# Patient Record
Sex: FEMALE | Race: BLACK OR AFRICAN AMERICAN | Employment: OTHER | URBAN - METROPOLITAN AREA
[De-identification: names, ages, dates, MRNs, and addresses within clinical notes are randomized per-mention and may not be internally consistent; named-entity substitution may affect disease eponyms.]

---

## 2017-01-24 RX ORDER — FENOFIBRATE 48 MG/1
TABLET, COATED ORAL
Qty: 30 TAB | Refills: 0 | Status: SHIPPED | OUTPATIENT
Start: 2017-01-24 | End: 2017-04-02 | Stop reason: SDUPTHER

## 2017-02-06 ENCOUNTER — OFFICE VISIT (OUTPATIENT)
Dept: INTERNAL MEDICINE CLINIC | Age: 67
End: 2017-02-06

## 2017-02-06 VITALS
RESPIRATION RATE: 76 BRPM | BODY MASS INDEX: 37.92 KG/M2 | TEMPERATURE: 97.9 F | SYSTOLIC BLOOD PRESSURE: 130 MMHG | HEIGHT: 63 IN | DIASTOLIC BLOOD PRESSURE: 81 MMHG | WEIGHT: 214 LBS | OXYGEN SATURATION: 95 %

## 2017-02-06 DIAGNOSIS — E11.9 CONTROLLED TYPE 2 DIABETES MELLITUS WITHOUT COMPLICATION, WITHOUT LONG-TERM CURRENT USE OF INSULIN (HCC): ICD-10-CM

## 2017-02-06 DIAGNOSIS — I10 ESSENTIAL HYPERTENSION, BENIGN: Primary | ICD-10-CM

## 2017-02-06 LAB — HBA1C MFR BLD HPLC: 6.4 % (ref 4.8–5.6)

## 2017-02-06 NOTE — PROGRESS NOTES
1. Have you been to the ER, urgent care clinic since your last visit? Hospitalized since your last visit?no    2. Have you seen or consulted any other health care providers outside of the [address] [place] [address] since your last visit? Include any pap smears or colon screening. no

## 2017-02-06 NOTE — MR AVS SNAPSHOT
Visit Information Date & Time Provider Department Dept. Phone Encounter #  
 2/6/2017  3:45 PM Mike Tejeda, [address] [id number] Follow-up Instructions Return in about 3 months (around 5/6/2017) for follow up diabetes. Upcoming Health Maintenance Date Due Hepatitis C Screening 1950 DTaP/Tdap/Td series (1 - Tdap) 3/4/1969 EYE EXAM RETINAL OR DILATED Q1 11/20/2015 Pneumococcal 65+ Low/Medium Risk (2 of 2 - PCV13) 1/15/2016 FOOT EXAM Q1 1/15/2016 BREAST CANCER SCRN MAMMOGRAM 4/13/2016 MEDICARE YEARLY EXAM 7/23/2016 INFLUENZA AGE 9 TO ADULT 8/1/2016 GLAUCOMA SCREENING Q2Y 11/20/2016 HEMOGLOBIN A1C Q6M 12/28/2016 MICROALBUMIN Q1 3/8/2017 LIPID PANEL Q1 3/8/2017 COLONOSCOPY 8/23/2026 Allergies as of 2/6/2017  Review Complete On: 2/6/2017 By: Mike Tejeda MD  
  
 Severity Noted Reaction Type Reactions Ampicillin (Bulk)  11/06/2009    Rash Astelin [Azelastine]  08/10/2010   Intolerance Other (comments) NASAL IRRITATION Lipitor [Atorvastatin]  10/30/2009    Other (comments) Myalgias Pravachol [Pravastatin]  11/06/2009    Myalgia Welchol [Colesevelam]  04/23/2012   Side Effect Myalgia Zetia [Ezetimibe]  10/30/2009    Other (comments) Myalgias Zocor [Simvastatin]  10/30/2009    Nausea Only Current Immunizations  Reviewed on 11/10/2011 Name Date Pneumococcal Polysaccharide (PPSV-23) 1/15/2015 Not reviewed this visit You Were Diagnosed With   
  
 Codes Comments Essential hypertension, benign    -  Primary ICD-10-CM: I10 
ICD-9-CM: 401.1 Controlled type 2 diabetes mellitus without complication, without long-term current use of insulin ([facility]     ICD-10-CM: E11.9 ICD-9-CM: 250.00 Vitals  BP Temp Resp Height(growth percentile) Weight(growth percentile) SpO2  
 130/81 (BP 1 Location: Left arm, BP Patient Position: Sitting) 97.9 °F (36.6 °C) (Oral) (!) 76 5' 3\" (1.6 m) 214 lb (97.1 kg) 95% BMI OB Status Smoking Status 37.91 kg/m2 Hysterectomy Never Smoker BMI and BSA Data Body Mass Index Body Surface Area  
 37.91 kg/m 2 2.08 m 2 Preferred Pharmacy Pharmacy Name Phone [facility] [address] [address] [phone] Your Updated Medication List  
  
   
This list is accurate as of: 2/6/17  4:35 PM.  Always use your most recent med list.  
  
  
  
  
 ascorbic acid (vitamin C) 500 mg tablet Commonly known as:  VITAMIN C Take  by mouth. aspirin delayed-release 325 mg tablet Take 325 mg by mouth daily. fenofibrate nanocrystallized 48 mg tablet Commonly known as:  TRICOR  
TAKE 1 TABLET BY MOUTH EVERY DAY  
  
 FISH OIL PO Take 1,000 mg by mouth two (2) times a day. fluticasone 50 mcg/actuation nasal spray Commonly known as:  Tom Peel 2 Sprays by Both Nostrils route daily. * glucose blood VI test strips strip Commonly known as:  blood glucose test  
Check Blood Sugar 6-7 times a week * glucose blood VI test strips strip Commonly known as:  ASCENSIA AUTODISC VI, ONE TOUCH ULTRA TEST VI Check blood glucose twice daily. LOFIBRA 54 mg tablet Generic drug:  fenofibrate Take  by mouth daily. losartan-hydroCHLOROthiazide 100-25 mg per tablet Commonly known as:  HYZAAR  
TAKE 1 TABLET BY MOUTH EVERY DAY  
  
 MULTIVITAMIN PO Take  by mouth daily. Centrum silver  
  
 raNITIdine 300 mg tablet Commonly known as:  ZANTAC TAKE 1 TABLET BY MOUTH EVERY DAY  
  
 VITAMIN B-6 PO Take  by mouth daily. VITAMIN D3 2,000 unit Tab Generic drug:  cholecalciferol (vitamin D3) Take 2,000 Units by mouth daily. * Notice: This list has 2 medication(s) that are the same as other medications prescribed for you.  Read the directions carefully, and ask your doctor or other care provider to review them with you. We Performed the Following AMB POC HEMOGLOBIN A1C [82186 CPT(R)] Follow-up Instructions Return in about 3 months (around 5/6/2017) for follow up diabetes. Introducing [facility] SERVICES! Juan Alberto Chavez introduces D[employer]OM patient portal. Now you can access parts of your medical record, email your doctor's office, and request medication refills online. 1. In your internet browser, go to [email or web address] 2. Click on the First Time User? Click Here link in the Sign In box. You will see the New Member Sign Up page. 3. Enter your D[employer]OM Access Code exactly as it appears below. You will not need to use this code after youve completed the sign-up process. If you do not sign up before the expiration date, you must request a new code. · D[employer]OM Access Code: [email or web address] Expires: 5/7/2017  4:35 PM 
 
4. Enter the last four digits of your Social Security Number (xxxx) and Date of Birth (mm/dd/yyyy) as indicated and click Submit. You will be taken to the next sign-up page. 5. Create a DZZOM ID. This will be your DZZOM login ID and cannot be changed, so think of one that is secure and easy to remember. 6. Create a DZZOM password. You can change your password at any time. 7. Enter your Password Reset Question and Answer. This can be used at a later time if you forget your password. 8. Enter your e-mail address. You will receive e-mail notification when new information is available in [address] 9. Click Sign Up. You can now view and download portions of your medical record. 10. Click the Download Summary menu link to download a portable copy of your medical information. If you have questions, please visit the Frequently Asked Questions section of the D[employer]OM website. Remember, DZZOM is NOT to be used for urgent needs. For medical emergencies, dial 911. Now available from your iPhone and Android! Please provide this summary of care documentation to your next provider. Your primary care clinician is listed as Sandoval Pritchett. If you have any questions after today's visit, please call [phone].

## 2017-02-06 NOTE — PROGRESS NOTES
SUBJECTIVE:   Ms. Alena Oneill is a 74 y.o. female who is here for follow up of DM. Pt reports having emesis and diarrhea over the weekend. Pt states she had multiple episodes of emesis. Pt claims she began being able to hold down liquids on Saturday. Pt endorses contact with persons with similar symptoms. Pt claims her stools have returned to normal.     Pt's BP is well controlled at 130/81 today. Pt denies exercise outside regular daily activity. Pt claims she stays on the go. Pt reports episodes of burning/stinging in left hand. Pt claims she is right handed, and had an injection in her right hand a few months ago. Pt denies BLE edema. Mammogram: due 04/2017. Pt states they call her to schedule. Ophthalmology: up to date, 11/2016  Flu: declined today  Microfilament test: due    At this time, she is otherwise doing well and has brought no other complaints to my attention today. For a list of the medical issues addressed today, see the assessment and plan below. 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 All: is allergic to ampicillin (bulk); astelin [azelastine]; lipitor [atorvastatin]; pravachol [pravastatin]; welchol [colesevelam]; zetia [ezetimibe]; and zocor [simvastatin].    MEDS:   Current Outpatient Prescriptions   Medication Sig    fenofibrate nanocrystallized (TRICOR) 48 mg tablet TAKE 1 TABLET BY MOUTH EVERY DAY    losartan-hydroCHLOROthiazide (HYZAAR) 100-25 mg per tablet TAKE 1 TABLET BY MOUTH EVERY DAY    fenofibrate (LOFIBRA) 54 mg tablet Take  by mouth daily.  glucose blood VI test strips (ASCENSIA AUTODISC VI, ONE TOUCH ULTRA TEST VI) strip Check blood glucose twice daily.  ranitidine (ZANTAC) 300 mg tablet TAKE 1 TABLET BY MOUTH EVERY DAY    fluticasone (FLONASE) 50 mcg/actuation nasal spray 2 Sprays by Both Nostrils route daily.  ascorbic acid (VITAMIN C) 500 mg tablet Take  by mouth.  glucose blood VI test strips (BLOOD GLUCOSE TEST) strip Check Blood Sugar 6-7 times a week    cholecalciferol, vitamin D3, (VITAMIN D-3) 2,000 unit Tab Take 2,000 Units by mouth daily.  PYRIDOXINE HCL (VITAMIN B-6 PO) Take  by mouth daily.  aspirin delayed-release 325 mg tablet Take 325 mg by mouth daily.  MULTIVITAMINS (MULTIVITAMIN PO) Take  by mouth daily. Centrum silver    DOCOSAHEXANOIC ACID/EPA (FISH OIL PO) Take 1,000 mg by mouth two (2) times a day. No current facility-administered medications for this visit. FH: family history includes Alcohol abuse in her brother; Diabetes in her brother, mother, and sister; Heart Attack in her father; Heart Disease in her father, mother, and sister; Hypertension in her father and mother; Kidney Disease in her sister and sister; Stroke in her mother; Tuberculosis in her brother. SH:  reports that she has never smoked. She has never used smokeless tobacco. She reports that she drinks alcohol. She reports that she does not use illicit drugs.      Review of Systems - History obtained from the patient  General ROS: negative  Psychological ROS: negative  Ophthalmic ROS: negative  ENT ROS: negative  Respiratory ROS: no cough, shortness of breath, or wheezing  Cardiovascular ROS: no chest pain or dyspnea on exertion  Gastrointestinal ROS: no abdominal pain, change in bowel habits, or black or bloody stools  Genito-Urinary ROS: negative  Musculoskeletal ROS: negative  Neurological ROS: negative  Dermatological ROS: negative    OBJECTIVE:   Vitals:   Visit Vitals    /81 (BP 1 Location: Left arm, BP Patient Position: Sitting)    Temp 97.9 °F (36.6 °C) (Oral)    Resp (!) 76    Ht 5' 3\" (1.6 m)    Wt 214 lb (97.1 kg)    SpO2 95%    BMI 37.91 kg/m2      Gen: Pleasant 74 y.o.  female in NAD. HEENT: NC/AT. HEART: RRR, No M/G/R. LUNGS: CTAB No W/R. EXTREMITIES: Warm. No C/C/E. NEURO: Alert and oriented x 3. Cranial nerves grossly intact. No focal sensory or motor deficits noted. SKIN: Warm. Dry. No rashes or other lesions noted. ASSESSMENT/ PLAN:     Brice Meigs was seen today for diabetes, follow-up, other, other and other. Diagnoses and all orders for this visit:    Essential hypertension, benign    Controlled type 2 diabetes mellitus without complication, without long-term current use of insulin (HCC)  -     AMB POC HEMOGLOBIN A1C    This patient's blood pressure is stable and controlled on the current medication. Continue the current regimen. I ordered a POC Hgb A1C for monitoring of DM. I advised patient to exercise 3x/week. Pt was instructed to f/u if nausea persists, and eat/drink soft and bland foods. Pt will f/u in 3 months for DM. Follow-up Disposition:  Return in about 3 months (around 5/6/2017) for follow up diabetes. I have reviewed the patient's medications and risks/side effects/benefits were discussed. Diagnosis(-es) explained to patient and questions answered. Literature provided where appropriate.      Written by Humberto Dc, as dictated by Miguel Sultana MD.

## 2017-02-09 ENCOUNTER — TELEPHONE (OUTPATIENT)
Dept: INTERNAL MEDICINE CLINIC | Age: 67
End: 2017-02-09

## 2017-04-03 RX ORDER — FENOFIBRATE 48 MG/1
TABLET, COATED ORAL
Qty: 30 TAB | Refills: 0 | Status: SHIPPED | OUTPATIENT
Start: 2017-04-03 | End: 2017-05-02 | Stop reason: SDUPTHER

## 2017-05-02 RX ORDER — FENOFIBRATE 48 MG/1
TABLET, COATED ORAL
Qty: 30 TAB | Refills: 0 | Status: SHIPPED | OUTPATIENT
Start: 2017-05-02 | End: 2017-06-01 | Stop reason: SDUPTHER

## 2017-06-01 RX ORDER — FENOFIBRATE 48 MG/1
TABLET, COATED ORAL
Qty: 30 TAB | Refills: 0 | Status: SHIPPED | OUTPATIENT
Start: 2017-06-01 | End: 2017-07-06 | Stop reason: SDUPTHER

## 2017-06-14 ENCOUNTER — HOSPITAL ENCOUNTER (OUTPATIENT)
Dept: LAB | Age: 67
Discharge: HOME | End: 2017-06-14
Payer: MEDICARE

## 2017-06-14 ENCOUNTER — OFFICE VISIT (OUTPATIENT)
Dept: INTERNAL MEDICINE CLINIC | Age: 67
End: 2017-06-14

## 2017-06-14 VITALS
RESPIRATION RATE: 16 BRPM | WEIGHT: 219.2 LBS | BODY MASS INDEX: 38.84 KG/M2 | SYSTOLIC BLOOD PRESSURE: 125 MMHG | DIASTOLIC BLOOD PRESSURE: 75 MMHG | HEART RATE: 67 BPM | TEMPERATURE: 97.8 F | OXYGEN SATURATION: 96 % | HEIGHT: 63 IN

## 2017-06-14 DIAGNOSIS — E78.5 DYSLIPIDEMIA: ICD-10-CM

## 2017-06-14 DIAGNOSIS — Z00.00 ROUTINE GENERAL MEDICAL EXAMINATION AT A HEALTH CARE FACILITY: Primary | ICD-10-CM

## 2017-06-14 DIAGNOSIS — Z23 NEED FOR VACCINATION WITH 13-POLYVALENT PNEUMOCOCCAL CONJUGATE VACCINE: ICD-10-CM

## 2017-06-14 DIAGNOSIS — E11.9 CONTROLLED TYPE 2 DIABETES MELLITUS WITHOUT COMPLICATION, WITHOUT LONG-TERM CURRENT USE OF INSULIN (HCC): ICD-10-CM

## 2017-06-14 DIAGNOSIS — Z78.0 POSTMENOPAUSAL: ICD-10-CM

## 2017-06-14 DIAGNOSIS — Z13.39 SCREENING FOR ALCOHOLISM: ICD-10-CM

## 2017-06-14 DIAGNOSIS — I10 ESSENTIAL HYPERTENSION, BENIGN: ICD-10-CM

## 2017-06-14 DIAGNOSIS — Z11.59 NEED FOR HEPATITIS C SCREENING TEST: ICD-10-CM

## 2017-06-14 DIAGNOSIS — Z13.31 SCREENING FOR DEPRESSION: ICD-10-CM

## 2017-06-14 PROCEDURE — 83036 HEMOGLOBIN GLYCOSYLATED A1C: CPT

## 2017-06-14 PROCEDURE — 80053 COMPREHEN METABOLIC PANEL: CPT

## 2017-06-14 PROCEDURE — 86803 HEPATITIS C AB TEST: CPT

## 2017-06-14 PROCEDURE — 36415 COLL VENOUS BLD VENIPUNCTURE: CPT

## 2017-06-14 PROCEDURE — 82043 UR ALBUMIN QUANTITATIVE: CPT

## 2017-06-14 PROCEDURE — 80061 LIPID PANEL: CPT

## 2017-06-14 NOTE — PROGRESS NOTES
This is a Subsequent Medicare Annual Wellness Visit providing Personalized Prevention Plan Services (PPPS) (Performed 12 months after initial AWV and PPPS )    I have reviewed the patient's medical history in detail and updated the computerized patient record. 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 Deejay Barnes  04/21/08    negative; f/u 1 yr    HX KNEE ARTHROSCOPY Right 1999    HX ORTHOPAEDIC  01/04    trigger finger    HX SHOULDER ARTHROSCOPY      HX XAVIER AND BSO      [place]    Dr. Thuy Giles CUFF,CHRONIC  02/2004    Dr. Los Narvaez     Current Outpatient Prescriptions   Medication Sig Dispense Refill    fenofibrate nanocrystallized (TRICOR) 48 mg tablet TAKE 1 TABLET BY MOUTH EVERY DAY 30 Tab 0    losartan-hydroCHLOROthiazide (HYZAAR) 100-25 mg per tablet TAKE 1 TABLET BY MOUTH EVERY DAY 30 Tab 6    fenofibrate (LOFIBRA) 54 mg tablet Take  by mouth daily.  glucose blood VI test strips (ASCENSIA AUTODISC VI, ONE TOUCH ULTRA TEST VI) strip Check blood glucose twice daily. 100 Strip 11    ranitidine (ZANTAC) 300 mg tablet TAKE 1 TABLET BY MOUTH EVERY DAY 60 Tab 6    fluticasone (FLONASE) 50 mcg/actuation nasal spray 2 Sprays by Both Nostrils route daily. 1 Bottle 3    ascorbic acid (VITAMIN C) 500 mg tablet Take  by mouth.       glucose blood VI test strips (BLOOD GLUCOSE TEST) strip Check Blood Sugar 6-7 times a week 100 strip 11    cholecalciferol, vitamin D3, (VITAMIN D-3) 2,000 unit Tab Take 2,000 Units by mouth daily.  PYRIDOXINE HCL (VITAMIN B-6 PO) Take  by mouth daily.  aspirin delayed-release 325 mg tablet Take 325 mg by mouth daily.  MULTIVITAMINS (MULTIVITAMIN PO) Take  by mouth daily. Centrum silver      DOCOSAHEXANOIC ACID/EPA (FISH OIL PO) Take 1,000 mg by mouth two (2) times a day.        Allergies   Allergen Reactions    Ampicillin (Bulk) Rash    Astelin [Azelastine] Other (comments)     NASAL IRRITATION    Lipitor [Atorvastatin] Other (comments)     Myalgias      Pravachol [Pravastatin] Myalgia    Welchol [Colesevelam] Myalgia    Zetia [Ezetimibe] Other (comments)     Myalgias      Zocor [Simvastatin] Nausea Only     Family History   Problem Relation Age of Onset   [facility] Hypertension Mother    [facility] Stroke Mother     Diabetes Mother     Heart Disease Mother     Hypertension Father     Heart Disease Father     Heart Attack Father     Heart Disease Sister     Alcohol abuse Brother     Tuberculosis Brother     Diabetes Sister     Kidney Disease Sister     Kidney Disease Sister     Diabetes Brother      Social History   Substance Use Topics    Smoking status: Never Smoker    Smokeless tobacco: Never Used    Alcohol use 0.0 oz/week     0 Standard drinks or equivalent per week      Comment: occasionally     Patient Active Problem List   Diagnosis Code    Type II or unspecified type diabetes mellitus without mention of complication, not stated as uncontrolled E11.9    Essential hypertension, benign I10    Dyslipidemia E78.5    Low back pain M54.5    DJD (degenerative joint disease), multiple sites M15.9    GERD (gastroesophageal reflux disease) K21.9    Anxiety F41.9    Neck pain M54.2    Seasonal allergic rhinitis J30.2    Vitamin D deficiency E55.9    Obesity E66.9       Depression Risk Factor Screening:     PHQ over the last two weeks 6/14/2017   Little interest or pleasure in doing things Not at all   Feeling down, depressed or hopeless Not at all   Total Score PHQ 2 0     Alcohol Risk Factor Screening: On any occasion during the past 3 months, have you had more than 3 drinks containing alcohol? No    Do you average more than 7 drinks per week? No      Functional Ability and Level of Safety:     Hearing Loss   normal-to-mild    Activities of Daily Living   Self-care. Requires assistance with: no ADLs  ADL Assessment 6/14/2017   Feeding yourself No Help Needed   Getting from bed to chair No Help Needed   Getting dressed No Help Needed   Bathing or showering No Help Needed   Walk across the room (includes cane/walker) No Help Needed   Using the telphone No Help Needed   Taking your medications No Help Needed   Preparing meals No Help Needed   Managing money (expenses/bills) No Help Needed   Moderately strenuous housework (laundry) No Help Needed   Shopping for personal items (toiletries/medicines) No Help Needed   Shopping for groceries No Help Needed   Driving No Help Needed   Climbing a flight of stairs No Help Needed   Getting to places beyond walking distances No Help Needed     Fall Risk     Fall Risk Assessment, last 12 mths 6/14/2017   Able to walk? Yes   Fall in past 12 months? Yes   Fall with injury? Yes   Number of falls in past 12 months 1   Fall Risk Score 2     Abuse Screen   Patient is not abused    Review of Systems   Not required    Physical Examination     Evaluation of Cognitive Function:  Mood/affect:  happy  Appearance: age appropriate and casually dressed  Family member/caregiver input: n/a    No exam performed today for medicare wellness visit.     Patient Care Team:  Kyler Raygoza MD as PCP - General (Family Practice)  Karen Diop MD (Cardiology)  Yasmany Banks MD (Gastroenterology)  James Nelson MD (Optometry)    Advice/Referrals/Counseling   Education and counseling provided:  Pneumococcal Vaccine  Screening Mammography  Screening Pap and pelvic (covered once every 2 years)  Colorectal cancer screening tests  Cardiovascular screening blood test  Bone mass measurement (DEXA)  Screening for glaucoma  Diabetes screening test      Assessment/Plan     Encounter Diagnoses   Name Primary?  Essential hypertension, benign Yes    Dyslipidemia     Need for vaccination with 13-polyvalent pneumococcal conjugate vaccine     Postmenopausal     Routine general medical examination at a health care facility     Screening for alcoholism     Screening for depression     Need for hepatitis C screening test     Controlled type 2 diabetes mellitus without complication, without long-term current use of insulin ([address]      ADL's and support. Patient lives independently at home. Cardiovascular blood tests. Ordered today. Colorectal cancer screening. Completed 8/23/16. Diabetes screening tests. A1c, microalbumin ordered today. Glaucoma screenings. Done annually with Dr. Linda Pa. Pneumonia vaccine. Prevnar 13 given today. Advance care planning. Has medical directive at home. States that she wants her sons to make decisions for her. Legally  but not . NN encouraged her to complete a medical directive to confirm that her healthcare agents would be her sons. Provided education on advance care planning and Honoring Choices ACP info card with facilitator contact information. Bone density. Ordered today. Mammography. Done [facility] at [facility].  Pap tests and pelvic exams. Prior hysterectomy. Patient to discuss with PCP and schedule as needed. Brief history and med reconciliation completed by MA/LPN and reviewed by MD.      I have reviewed the information regarding the Medicare Annual Well Visit as documented by my nurse navigator; Agree with assessment.  Kumar Gramajo MD

## 2017-06-14 NOTE — PROGRESS NOTES
SUBJECTIVE:   Ms. Shrala Emerson is a 69 y.o. female who is here for follow up of DM and htn. Pt's BP is well controlled at 125/75 today. Pt's A1C was 6.4 on 2/6/2017. At this time, she is otherwise doing well and has brought no other complaints to my attention today. For a list of the medical issues addressed today, see the assessment and plan below. 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 All: is allergic to ampicillin (bulk); astelin [azelastine]; lipitor [atorvastatin]; pravachol [pravastatin]; welchol [colesevelam]; zetia [ezetimibe]; and zocor [simvastatin]. MEDS:   Current Outpatient Prescriptions   Medication Sig    fenofibrate nanocrystallized (TRICOR) 48 mg tablet TAKE 1 TABLET BY MOUTH EVERY DAY    losartan-hydroCHLOROthiazide (HYZAAR) 100-25 mg per tablet TAKE 1 TABLET BY MOUTH EVERY DAY    fenofibrate (LOFIBRA) 54 mg tablet Take  by mouth daily.  glucose blood VI test strips (ASCENSIA AUTODISC VI, ONE TOUCH ULTRA TEST VI) strip Check blood glucose twice daily.  ranitidine (ZANTAC) 300 mg tablet TAKE 1 TABLET BY MOUTH EVERY DAY    fluticasone (FLONASE) 50 mcg/actuation nasal spray 2 Sprays by Both Nostrils route daily.  ascorbic acid (VITAMIN C) 500 mg tablet Take  by mouth.     glucose blood VI test strips (BLOOD GLUCOSE TEST) strip Check Blood Sugar 6-7 times a week    cholecalciferol, vitamin D3, (VITAMIN D-3) 2,000 unit Tab Take 2,000 Units by mouth daily.  PYRIDOXINE HCL (VITAMIN B-6 PO) Take  by mouth daily.  aspirin delayed-release 325 mg tablet Take 325 mg by mouth daily.  MULTIVITAMINS (MULTIVITAMIN PO) Take  by mouth daily. Centrum silver    DOCOSAHEXANOIC ACID/EPA (FISH OIL PO) Take 1,000 mg by mouth two (2) times a day. No current facility-administered medications for this visit. FH: family history includes Alcohol abuse in her brother; Diabetes in her brother, mother, and sister; Heart Attack in her father; Heart Disease in her father, mother, and sister; Hypertension in her father and mother; Kidney Disease in her sister and sister; Stroke in her mother; Tuberculosis in her brother. SH:  reports that she has never smoked. She has never used smokeless tobacco. She reports that she drinks alcohol. She reports that she does not use illicit drugs. Review of Systems - History obtained from the patient  General ROS: negative  Psychological ROS: negative  Ophthalmic ROS: negative  ENT ROS: negative  Respiratory ROS: no cough, shortness of breath, or wheezing  Cardiovascular ROS: no chest pain or dyspnea on exertion  Gastrointestinal ROS: no abdominal pain, change in bowel habits, or black or bloody stools  Genito-Urinary ROS: negative  Musculoskeletal ROS: negative  Neurological ROS: negative  Dermatological ROS: negative    OBJECTIVE:   Vitals:   Visit Vitals    /75    Pulse 67    Temp 97.8 °F (36.6 °C) (Oral)    Resp 16    Ht 5' 3\" (1.6 m)    Wt 219 lb 3.2 oz (99.4 kg)    SpO2 96%    BMI 38.83 kg/m2      Gen: Pleasant 69 y.o.  female in NAD. HEENT: NC/AT. HEART: RRR, No M/G/R. LUNGS: CTAB No W/R. EXTREMITIES: Warm. No C/C/E. NEURO: Alert and oriented x 3. Cranial nerves grossly intact. No focal sensory or motor deficits noted. SKIN: Warm. Dry. No rashes or other lesions noted. ASSESSMENT/ PLAN:   Luis Felipe Boone was seen today for diabetes.     Diagnoses and all orders for this visit:    Essential hypertension, benign  -     METABOLIC PANEL, COMPREHENSIVE    Dyslipidemia  -     LIPID PANEL    Need for vaccination with 13-polyvalent pneumococcal conjugate vaccine  -     PNEUMOCOCCAL CONJ VACCINE 13 VALENT IM    Postmenopausal  -     DEXA BONE DENSITY STUDY AXIAL; Future    Routine general medical examination at a health care facility  -     DEXA BONE DENSITY STUDY AXIAL; Future  -     Depression Screen Annual    Screening for alcoholism    Screening for depression  -     Depression Screen Annual    Need for hepatitis C screening test  -     HEPATITIS C AB    Controlled type 2 diabetes mellitus without complication, without long-term current use of insulin (HCC)  -     HEMOGLOBIN A1C WITH EAG  -     MICROALBUMIN, UR, RAND W/ MICROALBUMIN/CREA RATIO  -     METABOLIC PANEL, COMPREHENSIVE      This patient's blood pressure is stable and controlled on the current medication. Continue the current regimen. I ordered a CMP lab for monitoring of medication. I ordered A1C and micoralbumin labs for monitoring of DM. I ordered a lipid panel for monitoring of hld. Pt was given Prevnar-13 in the office today. I ordered a Dexa bone density study for monitoring of postmenopausal bone changes. I ordered a Hep C lab for hepatitis C screening. Pt will f/u in 6 months for DM and htn. Follow-up Disposition:  Return in about 6 months (around 12/14/2017) for follow up diabetes and htn. I have reviewed the patient's medications and risks/side effects/benefits were discussed. Diagnosis(-es) explained to patient and questions answered. Literature provided where appropriate.      Written by Mariya Webb, as dictated by Magy Barber MD.

## 2017-06-14 NOTE — PATIENT INSTRUCTIONS
Lucero Cardoso Date 6/14/17  Medicare Part B Preventive Services Limitations Recommendation/Date completed if known Scheduled/ Next Due   Bone Mass Measurement  (age 72 & older, biennial) Requires diagnosis related to osteoporosis or estrogen deficiency. Biennial benefit unless patient has history of long-term glucocorticoid tx or baseline is needed because initial test was by other method Completed:    8/19/14  normal    Recommended every 2 years DUE:    Per Dr. Darrel Munoz Blood Tests (every 5 years)  Total cholesterol, HDL, Triglycerides Order as a panel if possible Completed:  3/8/16    Recommended annually DUE:  Now    Colorectal Cancer Screening  -Fecal occult blood test (annual)  -Flexible sigmoidoscopy (5y)  -Screening colonoscopy (10y)  -Barium Enema  Completed:    8/23/16   Colonoscopy by Dr. Ronal Velasco    Diverticulosis      DUE:    8/2021   Counseling to Prevent Tobacco Use (up to 8 sessions per year)  - Counseling greater than 3 and up to 10 minutes  - Counseling greater than 10 minutes Patients must be asymptomatic of tobacco-related conditions to receive as preventive service  Keep up the good work! Diabetes Screening Tests (at least every 3 years, Medicare covers annually or at 6-month intervals for prediabetic patients)    Fasting blood sugar (FBS) or glucose tolerance test (GTT)       Patient must be diagnosed with one of the following:  -Hypertension, Dyslipidemia, obesity, previous impaired FBS or GTT  Or any two of the following: overweight, FH of diabetes, age ? 72, history of gestational diabetes, birth of baby weighing more than 9 pounds Completed:    2/6/17  A1c 6.4%    Recommended annually DUE:    Every 3-6 months    Diabetes Self-Management Training (DSMT) (no USPSTF recommendation) Requires referral by treating physician for patient with diabetes or renal disease. 10 hours of initial DSMT session of no less than 30 minutes each in a continuous 12-month period.   2 hours of follow-up DSMT in subsequent years. Contact us if you would like refresher courses   Glaucoma Screening (no USPSTF recommendation) Diabetes mellitus, family history, , age 48 or over,  American, age 72 or over Completed:    Dr. Jeanine Gutierrez     Recommended annually DUE:    Annually - November    Human Immunodeficiency Virus (HIV) Screening (annually for increased risk patients)  HIV-1 and HIV-2 by EIA, RYAN, rapid antibody test, or oral mucosa transudate Patient must be at increased risk for HIV infection per USPSTF guidelines or pregnant. Tests covered annually for patients at increased risk. Pregnant patients may receive up to 3 test during pregnancy. N/a    Medical Nutrition Therapy (MNT) (for diabetes or renal disease not recommended schedule) Requires referral by treating physician for patient with diabetes or renal disease. Can be provided in same year as diabetes self-management training (DSMT), and CMS recommends medical nutrition therapy take place after DSMT. Up to 3 hours for initial year and 2 hours in subsequent years. N/a    Seasonal Influenza Vaccination (annually)  Due in the fall      Recommended annually    DUE every Fall   Pneumococcal Vaccination (once after 65)  Completed:  Pneumovax 23 - 1/15/15 Due for Prevnar 13    A Shingles Vaccine is also recommended once in a lifetime after age 61. Declined     Hepatitis B Vaccinations (if medium/high risk) Medium/high risk factors:  End-stage renal disease,  Hemophiliacs who received Factor VIII or IX concentrates, Clients of institutions for the mentally retarded, Persons who live in the same house as a HepB virus carrier, Homosexual men, Illicit injectable drug abusers. N/A   Screening Mammography (biennial age 54-69)?  Annually (age 36 or over) Completed:   4/2017  Recommended annually DUE:  4/2018    Screening Pap Tests and Pelvic Examination (up to age 79 and after 79 if unknown history or abnormal study last 10 years) Every 24 months except high risk Completed:        Recommended every 2 years DUE:    Per Dr. Santino Muñoz for Abdominal Aortic Aneurysm (AAA) (once) Patient must be referred through [facility] and not have had a screening for abdominal aortic aneurysm before under Medicare. Limited to patients who meet one of the following criteria:  - Men who are 73-68 years old and have smoked more than 100 cigarettes in their lifetime.  -Anyone with a FH of AAA  -Anyone recommended for screening by USPSTF Not covered by Medicare as preventive. Not indicated at this time        Vaccine Information Statement     Pneumococcal Conjugate Vaccine (PCV13): What You Need to Know    Many Vaccine Information Statements are available in [place] and other languages. See www.immunize.org/vis. Hojas de información Sobre Vacunas están disponibles en español y en muchos otros idiomas. Visite www.immunize.org/vis. 1. Why get vaccinated? Vaccination can protect both children and adults from pneumococcal disease. Pneumococcal disease is caused by bacteria that can spread from person to person through close contact. It can cause ear infections, and it can also lead to more serious infections of the:   Lungs (pneumonia),   Blood (bacteremia), and   Covering of the brain and spinal cord (meningitis). Pneumococcal pneumonia is most common among adults. Pneumococcal meningitis can cause deafness and brain damage, and it kills about 1 child in 10 who get it. Anyone can get pneumococcal disease, but children under 3years of age and adults 72 years and older, people with certain medical conditions, and cigarette smokers are at the highest risk. Before there was a vaccine, the [facility] saw:   more than 700 cases of meningitis,   about 13,000 blood infections,   about 5 million ear infections, and   about 200 deaths  in children under 5 each year from pneumococcal disease.  Since vaccine became available, severe pneumococcal disease in these children has fallen by 88%. About 18,000 older adults die of pneumococcal disease each year in the [place]. Treatment of pneumococcal infections with penicillin and other drugs is not as effective as it used to be, because some strains of the disease have become resistant to these drugs. This makes prevention of the disease, through vaccination, even more important. 2. PCV13 vaccine    Pneumococcal conjugate vaccine (called PCV13) protects against 13 types of pneumococcal bacteria. PCV13 is routinely given to children at 2, 4, 6, and 1515 months of age. It is also recommended for children and adults 3to 59years of age with certain health conditions, and for all adults 72years of age and older. Your doctor can give you details. 3. Some people should not get this vaccine    Anyone who has ever had a life-threatening allergic reaction to a dose of this vaccine, to an earlier pneumococcal vaccine called PCV7, or to any vaccine containing diphtheria toxoid (for example, DTaP), should not get PCV13. Anyone with a severe allergy to any component of PCV13 should not get the vaccine. Tell your doctor if the person being vaccinated has any severe allergies. If the person scheduled for vaccination is not feeling well, your healthcare provider might decide to reschedule the shot on another day. 4. Risks of a vaccine reaction    With any medicine, including vaccines, there is a chance of reactions. These are usually mild and go away on their own, but serious reactions are also possible. Problems reported following PCV13 varied by age and dose in the series. The most common problems reported among children were:    About half became drowsy after the shot, had a temporary loss of appetite, or had redness or tenderness where the shot was given.  About 1 out of 3 had swelling where the shot was given.    About 1 out of 3 had a mild fever, and about 1 in 20 had a fever over 102.2°F.   Up to about 8 out of 10 became fussy or irritable. Adults have reported pain, redness, and swelling where the shot was given; also mild fever, fatigue, headache, chills, or muscle pain. [facility] children who get PCV13 along with inactivated flu vaccine at the same time may be at increased risk for seizures caused by fever. Ask your doctor for more information. Problems that could happen after any vaccine:     People sometimes faint after a medical procedure, including vaccination. Sitting or lying down for about 15 minutes can help prevent fainting, and injuries caused by a fall. Tell your doctor if you feel dizzy, or have vision changes or ringing in the ears.  Some older children and adults get severe pain in the shoulder and have difficulty moving the arm where a shot was given. This happens very rarely.  Any medication can cause a severe allergic reaction. Such reactions from a vaccine are very rare, estimated at about 1 in a million doses, and would happen within a few minutes to a few hours after the vaccination. As with any medicine, there is a very small chance of a vaccine causing a serious injury or death. The safety of vaccines is always being monitored. For more information, visit: www.cdc.gov/vaccinesafety/     5. What if there is a serious reaction? What should I look for?  Look for anything that concerns you, such as signs of a severe allergic reaction, very high fever, or unusual behavior. Signs of a severe allergic reaction can include hives, swelling of the face and throat, difficulty breathing, a fast heartbeat, dizziness, and weakness  usually within a few minutes to a few hours after the vaccination. What should I do?  If you think it is a severe allergic reaction or other emergency that cant wait, call 9-1-1 or get the person to the nearest hospital. Otherwise, call your doctor.     Reactions should be reported to the Vaccine Adverse Event Reporting System (VAERS). Your doctor should file this report, or you can do it yourself through the VAERS web site at www.vaers. hhs.gov, or by calling 6-509.579.8988. VAERS does not give medical advice. 6. The National Vaccine Injury Compensation Program    The [facility] Vaccine Injury Compensation Program (VICP) is a federal program that was created to compensate people who may have been injured by certain vaccines. Persons who believe they may have been injured by a vaccine can learn about the program and about filing a claim by calling 9-130.936.7953 or visiting the [employer] website at www.[facility].gov/vaccinecompensation. There is a time limit to file a claim for compensation. 7. How can I learn more?  Ask your healthcare provider. He or she can give you the vaccine package insert or suggest other sources of information.  Call your local or state health department.  Contact the Centers for Disease Control and Prevention (CDC):  - Call 3-822.626.9874 (1-800-CDC-INFO) or  - Visit CDCs website at www.cdc.gov/vaccines    Vaccine Information Statement   PCV13 Vaccine   11/5/2015   [address] HERNE[address] Chua [id number]-30    Department of Health and Human Services  Centers for Disease Control and Prevention    Office Use Only         Dual-Energy X-Ray Absorptiometry (DXA) Test: About This Test  What is it? Dual-energy X-ray absorptiometry (DXA) is a test that uses two different X-ray beams to check bone thickness (density) in your spine and hip. This information is used to estimate the strength of your bones. Why is this test done? A DXA test is done to check for bone thinning and weakness, which can make it easier for you to break a bone. It is often done for:  · People who are at risk for osteoporosis, including:  ¨ Women who are age 72 and older. ¨ Older men. ¨ People who take some medications, such as corticosteroids.   ¨ People who have certain medical conditions, such as hyperparathyroidism. · People who have osteoporosis, to see how well treatment is working. What happens before the test?  · Women who are pregnant should not have this test. Let your doctor know if you are or might be pregnant. · You may be able to leave your clothes on for the test, but you will remove any metal buttons or james for the test.  What happens during the test?  · You will lie down on your back on a padded table. · You may need to lie with your legs straight or with your lower legs resting on a platform built into the table. · The machine will scan your bones and measure the amount of radiation they absorb. During this test you are exposed to a very low dose of radiation. How long does the test take? · The test will take about 20 minutes. What happens after the test?  · You will probably be able to go home right away. · You can go back to your usual activities right away. Follow-up care is a key part of your treatment and safety. Be sure to make and go to all appointments, and call your doctor if you are having problems. It's also a good idea to keep a list of the medicines you take. Ask your doctor when you can expect to have your test results. Where can you learn more? Go to http://yasmeen-vladislav.info/. Enter T703 in the search box to learn more about \"Dual-Energy X-Ray Absorptiometry (DXA) Test: About This Test.\"  Current as of: August 4, 2016  Content Version: 11.2  © 1532-7330 for; to (do). Care instructions adapted under license by [employer] (which disclaims liability or warranty for this information). If you have questions about a medical condition or this instruction, always ask your healthcare professional. [place] [address] any warranty or liability for your use of this information.

## 2017-06-14 NOTE — PROGRESS NOTES
1. Have you been to the ER, urgent care clinic since your last visit? Hospitalized since your last visit? No    2. Have you seen or consulted any other health care providers outside of the [address] [place] [address] since your last visit? Include any pap smears or colon screening.  No     Last eye exam done in Nov ,2016

## 2017-06-14 NOTE — MR AVS SNAPSHOT
Visit Information Date & Time Provider Department Dept. Phone Encounter #  
 6/14/2017 11:30 AM Angela Ramos, [address] [id number] Follow-up Instructions Return in about 6 months (around 12/14/2017) for follow up diabetes and htn. Upcoming Health Maintenance Date Due Hepatitis C Screening 1950 DTaP/Tdap/Td series (1 - Tdap) 3/4/1969 EYE EXAM RETINAL OR DILATED Q1 11/20/2015 Pneumococcal 65+ Low/Medium Risk (2 of 2 - PCV13) 1/15/2016 FOOT EXAM Q1 1/15/2016 BREAST CANCER SCRN MAMMOGRAM 4/13/2016 GLAUCOMA SCREENING Q2Y 11/20/2016 MICROALBUMIN Q1 3/8/2017 LIPID PANEL Q1 3/8/2017 INFLUENZA AGE 9 TO ADULT 8/1/2017 HEMOGLOBIN A1C Q6M 8/6/2017 MEDICARE YEARLY EXAM 6/15/2018 COLONOSCOPY 8/23/2026 Allergies as of 6/14/2017  Review Complete On: 6/14/2017 By: Angela Ramos MD  
  
 Severity Noted Reaction Type Reactions Ampicillin (Bulk)  11/06/2009    Rash Astelin [Azelastine]  08/10/2010   Intolerance Other (comments) NASAL IRRITATION Lipitor [Atorvastatin]  10/30/2009    Other (comments) Myalgias Pravachol [Pravastatin]  11/06/2009    Myalgia Welchol [Colesevelam]  04/23/2012   Side Effect Myalgia Zetia [Ezetimibe]  10/30/2009    Other (comments) Myalgias Zocor [Simvastatin]  10/30/2009    Nausea Only Current Immunizations  Reviewed on 11/10/2011 Name Date Pneumococcal Conjugate (PCV-13) 6/14/2017  1:00 PM  
 Pneumococcal Polysaccharide (PPSV-23) 1/15/2015 Not reviewed this visit You Were Diagnosed With   
  
 Codes Comments Essential hypertension, benign    -  Primary ICD-10-CM: I10 
ICD-9-CM: 401.1 Dyslipidemia     ICD-10-CM: E78.5 ICD-9-CM: 272.4 Need for vaccination with 13-polyvalent pneumococcal conjugate vaccine     ICD-10-CM: K49 ICD-9-CM: V03.82 Postmenopausal     ICD-10-CM: Z78.0 ICD-9-CM: V49.81   
 Routine general medical examination at a health care facility     ICD-10-CM: Z00.00 ICD-9-CM: V70.0 Screening for alcoholism     ICD-10-CM: Z13.89 ICD-9-CM: V79.1 Screening for depression     ICD-10-CM: Z13.89 ICD-9-CM: V79.0 Need for hepatitis C screening test     ICD-10-CM: Z11.59 
ICD-9-CM: V73.89 Controlled type 2 diabetes mellitus without complication, without long-term current use of insulin ([facility] [address]     ICD-10-CM: E11.9 ICD-9-CM: 250.00 Vitals BP Pulse Temp Resp Height(growth percentile) Weight(growth percentile) 125/75 67 97.8 °F (36.6 °C) (Oral) 16 5' 3\" (1.6 m) 219 lb 3.2 oz (99.4 kg) SpO2 BMI OB Status Smoking Status 96% 38.83 kg/m2 Hysterectomy Never Smoker BMI and BSA Data Body Mass Index Body Surface Area  
 38.83 kg/m 2 2.1 m 2 Preferred Pharmacy Pharmacy Name Phone [facility] [address] [phone] Your Updated Medication List  
  
   
This list is accurate as of: 6/14/17  1:13 PM.  Always use your most recent med list.  
  
  
  
  
 ascorbic acid (vitamin C) 500 mg tablet Commonly known as:  VITAMIN C Take  by mouth. aspirin delayed-release 325 mg tablet Take 325 mg by mouth daily. fenofibrate nanocrystallized 48 mg tablet Commonly known as:  TRICOR  
TAKE 1 TABLET BY MOUTH EVERY DAY  
  
 FISH OIL PO Take 1,000 mg by mouth two (2) times a day. fluticasone 50 mcg/actuation nasal spray Commonly known as:  Astrid Rad 2 Sprays by Both Nostrils route daily. * glucose blood VI test strips strip Commonly known as:  blood glucose test  
Check Blood Sugar 6-7 times a week * glucose blood VI test strips strip Commonly known as:  ASCENSIA AUTODISC VI, ONE TOUCH ULTRA TEST VI Check blood glucose twice daily. LOFIBRA 54 mg tablet Generic drug:  fenofibrate Take  by mouth daily. losartan-hydroCHLOROthiazide 100-25 mg per tablet Commonly known as:  HYZAAR  
TAKE 1 TABLET BY MOUTH EVERY DAY  
  
 MULTIVITAMIN PO Take  by mouth daily. Centrum silver  
  
 raNITIdine 300 mg tablet Commonly known as:  ZANTAC TAKE 1 TABLET BY MOUTH EVERY DAY  
  
 VITAMIN B-6 PO Take  by mouth daily. VITAMIN D3 2,000 unit Tab Generic drug:  cholecalciferol (vitamin D3) Take 2,000 Units by mouth daily. * Notice: This list has 2 medication(s) that are the same as other medications prescribed for you. Read the directions carefully, and ask your doctor or other care provider to review them with you. We Performed the Following [address] [[id number] [facility]] HEMOGLOBIN A1C WITH EAG [69139 CPT(R)] HEPATITIS C AB [54089 CPT(R)] LIPID PANEL [38183 CPT(R)] METABOLIC PANEL, COMPREHENSIVE [25120 CPT(R)] MICROALBUMIN, UR, RAND W/ MICROALBUMIN/CREA RATIO [id number] CPT(R)] PNEUMOCOCCAL CONJ VACCINE 13 VALENT IM [id number] CPT(R)] Follow-up Instructions Return in about 6 months (around 12/14/2017) for follow up diabetes and htn. To-Do List   
 06/21/2017 Imaging:  DEXA BONE DENSITY STUDY AXIAL Patient Instructions Steffany Mejia Date 6/14/17 Medicare Part B Preventive Services Limitations Recommendation/Date completed if known Scheduled/ Next Due Bone Mass Measurement 
(age 72 & older, biennial) Requires diagnosis related to osteoporosis or estrogen deficiency. Biennial benefit unless patient has history of long-term glucocorticoid tx or baseline is needed because initial test was by other method Completed: 
 
8/19/14 
normal 
 
Recommended every 2 years DUE: 
 
Per Dr. Alysha Bowden Blood Tests (every 5 years) Total cholesterol, HDL, Triglycerides Order as a panel if possible Completed: 
3/8/16 Recommended annually DUE: 
Now   
Colorectal Cancer Screening 
-Fecal occult blood test (annual) -Flexible sigmoidoscopy (5y) 
-Screening colonoscopy (10y) -Barium Enema  Completed: 
 
8/23/16 Colonoscopy by Dr. Nohelia Swenson Diverticulosis DUE: 
 
8/2021 Counseling to Prevent Tobacco Use (up to 8 sessions per year) - Counseling greater than 3 and up to 10 minutes - Counseling greater than 10 minutes Patients must be asymptomatic of tobacco-related conditions to receive as preventive service  Keep up the good work! Diabetes Screening Tests (at least every 3 years, Medicare covers annually or at 6-month intervals for prediabetic patients) Fasting blood sugar (FBS) or glucose tolerance test (GTT) Patient must be diagnosed with one of the following: 
-Hypertension, Dyslipidemia, obesity, previous impaired FBS or GTT 
Or any two of the following: overweight, FH of diabetes, age ? 72, history of gestational diabetes, birth of baby weighing more than 9 pounds Completed: 
 
2/6/17 A1c 6.4% Recommended annually DUE: 
 
Every 3-6 months Diabetes Self-Management Training (DSMT) (no USPSTF recommendation) Requires referral by treating physician for patient with diabetes or renal disease. 10 hours of initial DSMT session of no less than 30 minutes each in a continuous 12-month period. 2 hours of follow-up DSMT in subsequent years. Contact us if you would like refresher courses Glaucoma Screening (no USPSTF recommendation) Diabetes mellitus, family history, , age 48 or over,  American, age 72 or over Completed: 
 
Dr. Cayetano Estrada Recommended annually DUE: 
 
Annually - November Human Immunodeficiency Virus (HIV) Screening (annually for increased risk patients) HIV-1 and HIV-2 by EIA, RYAN, rapid antibody test, or oral mucosa transudate Patient must be at increased risk for HIV infection per USPSTF guidelines or pregnant. Tests covered annually for patients at increased risk. Pregnant patients may receive up to 3 test during pregnancy.   N/a   
 Medical Nutrition Therapy (MNT) (for diabetes or renal disease not recommended schedule) Requires referral by treating physician for patient with diabetes or renal disease. Can be provided in same year as diabetes self-management training (DSMT), and CMS recommends medical nutrition therapy take place after DSMT. Up to 3 hours for initial year and 2 hours in subsequent years. N/a Seasonal Influenza Vaccination (annually)  Due in the fall Recommended annually DUE every Fall Pneumococcal Vaccination (once after 65)  Completed: 
Pneumovax 23 - 1/15/15 Due for Prevnar 13 A Shingles Vaccine is also recommended once in a lifetime after age 61. Declined Hepatitis B Vaccinations (if medium/high risk) Medium/high risk factors:  End-stage renal disease, Hemophiliacs who received Factor VIII or IX concentrates, Clients of institutions for the mentally retarded, Persons who live in the same house as a HepB virus carrier, Homosexual men, Illicit injectable drug abusers. N/A Screening Mammography (biennial age 54-69)? Annually (age 36 or over) Completed:  
4/2017 Recommended annually DUE: 
4/2018 Screening Pap Tests and Pelvic Examination (up to age 79 and after 79 if unknown history or abnormal study last 10 years) Every 24 months except high risk Completed: 
 
 
 
Recommended every 2 years DUE: 
 
Per Dr. Solange Watts for Abdominal Aortic Aneurysm (AAA) (once) Patient must be referred through [facility] and not have had a screening for abdominal aortic aneurysm before under Medicare. Limited to patients who meet one of the following criteria: 
- Men who are 73-68 years old and have smoked more than 100 cigarettes in their lifetime. 
-Anyone with a FH of AAA 
-Anyone recommended for screening by USPSTF Not covered by Medicare as preventive. Not indicated at this time Vaccine Information Statement Pneumococcal Conjugate Vaccine (PCV13): What You Need to Know Many Vaccine Information Statements are available in [place] and other languages. See www.immunize.org/vis. Hojas de información Sobre Vacunas están disponibles en español y en muchos otros idiomas. Visite www.immunize.org/vis. 1. Why get vaccinated? Vaccination can protect both children and adults from pneumococcal disease. Pneumococcal disease is caused by bacteria that can spread from person to person through close contact. It can cause ear infections, and it can also lead to more serious infections of the: 
 Lungs (pneumonia),  Blood (bacteremia), and 
 Covering of the brain and spinal cord (meningitis). Pneumococcal pneumonia is most common among adults. Pneumococcal meningitis can cause deafness and brain damage, and it kills about 1 child in 10 who get it. Anyone can get pneumococcal disease, but children under 3years of age and adults 72 years and older, people with certain medical conditions, and cigarette smokers are at the highest risk. Before there was a vaccine, the [facility] saw: 
 more than 700 cases of meningitis, 
 about 13,000 blood infections, 
 about 5 million ear infections, and 
 about 200 deaths 
in children under 5 each year from pneumococcal disease. Since vaccine became available, severe pneumococcal disease in these children has fallen by 88%. About 18,000 older adults die of pneumococcal disease each year in the [place]. Treatment of pneumococcal infections with penicillin and other drugs is not as effective as it used to be, because some strains of the disease have become resistant to these drugs. This makes prevention of the disease, through vaccination, even more important. 2. PCV13 vaccine Pneumococcal conjugate vaccine (called PCV13) protects against 13 types of pneumococcal bacteria. PCV13 is routinely given to children at 2, 4, 6, and 1515 months of age.  It is also recommended for children and adults 3to 59years of age with certain health conditions, and for all adults 72years of age and older. Your doctor can give you details. 3. Some people should not get this vaccine Anyone who has ever had a life-threatening allergic reaction to a dose of this vaccine, to an earlier pneumococcal vaccine called PCV7, or to any vaccine containing diphtheria toxoid (for example, DTaP), should not get PCV13. Anyone with a severe allergy to any component of PCV13 should not get the vaccine. Tell your doctor if the person being vaccinated has any severe allergies. If the person scheduled for vaccination is not feeling well, your healthcare provider might decide to reschedule the shot on another day. 4. Risks of a vaccine reaction With any medicine, including vaccines, there is a chance of reactions. These are usually mild and go away on their own, but serious reactions are also possible. Problems reported following PCV13 varied by age and dose in the series. The most common problems reported among children were:  About half became drowsy after the shot, had a temporary loss of appetite, or had redness or tenderness where the shot was given.  About 1 out of 3 had swelling where the shot was given.  About 1 out of 3 had a mild fever, and about 1 in 20 had a fever over 102.2°F. 
 Up to about 8 out of 10 became fussy or irritable. Adults have reported pain, redness, and swelling where the shot was given; also mild fever, fatigue, headache, chills, or muscle pain. Samul Chill children who get PCV13 along with inactivated flu vaccine at the same time may be at increased risk for seizures caused by fever. Ask your doctor for more information. Problems that could happen after any vaccine:  People sometimes faint after a medical procedure, including vaccination. Sitting or lying down for about 15 minutes can help prevent fainting, and injuries caused by a fall.  Tell your doctor if you feel dizzy, or have vision changes or ringing in the ears.  Some older children and adults get severe pain in the shoulder and have difficulty moving the arm where a shot was given. This happens very rarely.  Any medication can cause a severe allergic reaction. Such reactions from a vaccine are very rare, estimated at about 1 in a million doses, and would happen within a few minutes to a few hours after the vaccination. As with any medicine, there is a very small chance of a vaccine causing a serious injury or death. The safety of vaccines is always being monitored. For more information, visit: www.cdc.gov/vaccinesafety/  
 
5. What if there is a serious reaction? What should I look for?  Look for anything that concerns you, such as signs of a severe allergic reaction, very high fever, or unusual behavior. Signs of a severe allergic reaction can include hives, swelling of the face and throat, difficulty breathing, a fast heartbeat, dizziness, and weakness  usually within a few minutes to a few hours after the vaccination. What should I do?  If you think it is a severe allergic reaction or other emergency that cant wait, call 9-1-1 or get the person to the nearest hospital. Otherwise, call your doctor. Reactions should be reported to the Vaccine Adverse Event Reporting System (VAERS). Your doctor should file this report, or you can do it yourself through the VAERS web site at www.vaers. hhs.gov, or by calling 6-773.551.4398. VAERS does not give medical advice. 6. The National Vaccine Injury Compensation Program 
 
The [facility] Vaccine Injury Compensation Program (VICP) is a federal program that was created to compensate people who may have been injured by certain vaccines. Persons who believe they may have been injured by a vaccine can learn about the program and about filing a claim by calling 8-811.695.3183 or visiting the [employer] website at www.[facility].gov/vaccinecompensation.   There is a time limit to file a claim for compensation. 7. How can I learn more?  Ask your healthcare provider. He or she can give you the vaccine package insert or suggest other sources of information.  Call your local or state health department.  Contact the Centers for Disease Control and Prevention (CDC): 
- Call 0-544.981.3430 (1-800-CDC-INFO) or 
- Visit CDCs website at www.cdc.gov/vaccines Vaccine Information Statement PCV13 Vaccine 11/5/2015  
[address] [id number] Department of [employer] Centers for Disease Control and Prevention Office Use Only Dual-Energy X-Ray Absorptiometry (DXA) Test: About This Test 
What is it? Dual-energy X-ray absorptiometry (DXA) is a test that uses two different X-ray beams to check bone thickness (density) in your spine and hip. This information is used to estimate the strength of your bones. Why is this test done? A DXA test is done to check for bone thinning and weakness, which can make it easier for you to break a bone. It is often done for: 
· People who are at risk for osteoporosis, including: ¨ Women who are age 72 and older. ¨ Older men. ¨ People who take some medications, such as corticosteroids. ¨ People who have certain medical conditions, such as hyperparathyroidism. · People who have osteoporosis, to see how well treatment is working. What happens before the test? 
· Women who are pregnant should not have this test. Let your doctor know if you are or might be pregnant. · You may be able to leave your clothes on for the test, but you will remove any metal buttons or james for the test. 
What happens during the test? 
· You will lie down on your back on a padded table. · You may need to lie with your legs straight or with your lower legs resting on a platform built into the table. · The machine will scan your bones and measure the amount of radiation they absorb.  During this test you are exposed to a very low dose of radiation. How long does the test take? · The test will take about 20 minutes. What happens after the test? 
· You will probably be able to go home right away. · You can go back to your usual activities right away. Follow-up care is a key part of your treatment and safety. Be sure to make and go to all appointments, and call your doctor if you are having problems. It's also a good idea to keep a list of the medicines you take. Ask your doctor when you can expect to have your test results. Where can you learn more? Go to http://yasmeen-vladislav.info/. Enter K478 in the search box to learn more about \"Dual-Energy X-Ray Absorptiometry (DXA) Test: About This Test.\" Current as of: August 4, 2016 Content Version: 11.2 © 8336-7757 [employer]. Care instructions adapted under license by [employer] (which disclaims liability or warranty for this information). If you have questions about a medical condition or this instruction, always ask your healthcare professional. [address] any warranty or liability for your use of this information. Introducing [facility]! Caitlin Madrigal introduces [employer] patient portal. Now you can access parts of your medical record, email your doctor's office, and request medication refills online. 1. In your internet browser, go to [email or web address] 2. Click on the First Time User? Click Here link in the Sign In box. You will see the New Member Sign Up page. 3. Enter your [employer] Access Code exactly as it appears below. You will not need to use this code after youve completed the sign-up process. If you do not sign up before the expiration date, you must request a new code. · [employer] Access Code: [email or web address] Expires: 9/12/2017 12:49 PM 
 
4. Enter the last four digits of your Social Security Number (xxxx) and Date of Birth (mm/dd/yyyy) as indicated and click Submit.  You will be taken to the next sign-up page. 5. Create a [employer] ID. This will be your [employer] login ID and cannot be changed, so think of one that is secure and easy to remember. 6. Create a [employer] password. You can change your password at any time. 7. Enter your Password Reset Question and Answer. This can be used at a later time if you forget your password. 8. Enter your e-mail address. You will receive e-mail notification when new information is available in [address]. 9. Click Sign Up. You can now view and download portions of your medical record. 10. Click the Download Summary menu link to download a portable copy of your medical information. If you have questions, please visit the Frequently Asked Questions section of the [employer] website. Remember, [employer] is NOT to be used for urgent needs. For medical emergencies, dial 911. Now available from your iPhone and Android! Please provide this summary of care documentation to your next provider. Your primary care clinician is listed as Javier Sarkar. If you have any questions after today's visit, please call [phone].

## 2017-06-15 LAB
ALBUMIN SERPL-MCNC: 4.2 G/DL (ref 3.6–4.8)
ALBUMIN/CREAT UR: 6.5 MG/G CREAT (ref 0–30)
ALBUMIN/GLOB SERPL: 1.4 {RATIO} (ref 1.2–2.2)
ALP SERPL-CCNC: 93 IU/L (ref 39–117)
ALT SERPL-CCNC: 23 IU/L (ref 0–32)
AST SERPL-CCNC: 21 IU/L (ref 0–40)
BILIRUB SERPL-MCNC: 0.4 MG/DL (ref 0–1.2)
BUN SERPL-MCNC: 16 MG/DL (ref 8–27)
BUN/CREAT SERPL: 22 (ref 12–28)
CALCIUM SERPL-MCNC: 9.6 MG/DL (ref 8.7–10.3)
CHLORIDE SERPL-SCNC: 99 MMOL/L (ref 96–106)
CHOLEST SERPL-MCNC: 197 MG/DL (ref 100–199)
CO2 SERPL-SCNC: 24 MMOL/L (ref 18–29)
CREAT SERPL-MCNC: 0.73 MG/DL (ref 0.57–1)
CREAT UR-MCNC: 98.8 MG/DL
EST. AVERAGE GLUCOSE BLD GHB EST-MCNC: 143 MG/DL
GLOBULIN SER CALC-MCNC: 3.1 G/DL (ref 1.5–4.5)
GLUCOSE SERPL-MCNC: 108 MG/DL (ref 65–99)
HBA1C MFR BLD: 6.6 % (ref 4.8–5.6)
HCV AB S/CO SERPL IA: <0.1 S/CO RATIO (ref 0–0.9)
HDLC SERPL-MCNC: 65 MG/DL
LDLC SERPL CALC-MCNC: 121 MG/DL (ref 0–99)
MICROALBUMIN UR-MCNC: 6.4 UG/ML
POTASSIUM SERPL-SCNC: 4.3 MMOL/L (ref 3.5–5.2)
PROT SERPL-MCNC: 7.3 G/DL (ref 6–8.5)
SODIUM SERPL-SCNC: 141 MMOL/L (ref 134–144)
TRIGL SERPL-MCNC: 53 MG/DL (ref 0–149)
VLDLC SERPL CALC-MCNC: 11 MG/DL (ref 5–40)

## 2017-06-16 NOTE — PROGRESS NOTES
Your current lab results reveal a elevated ldl. Your total cholesterol should be under 200 and your ldl under 100. Work on following a low fat diet and exercise at least three times a week. A1c- Your current hgbA1c is slightly lower than your last level of 6.7. Keep up the good work! Continue to work on following a diabetic diet and exercise. Recheck this test: hgbA1c  in  3 months. Continue with current  Medications. CMP-Normal electrolyte levels except for a elevation in glucose levels, normal renal, and liver function.

## 2017-07-06 RX ORDER — FENOFIBRATE 48 MG/1
TABLET, COATED ORAL
Qty: 30 TAB | Refills: 0 | Status: SHIPPED | OUTPATIENT
Start: 2017-07-06 | End: 2017-08-08 | Stop reason: SDUPTHER

## 2017-07-11 ENCOUNTER — HOSPITAL ENCOUNTER (OUTPATIENT)
Dept: BONE DENSITY | Age: 67
Discharge: HOME | End: 2017-07-11
Attending: FAMILY MEDICINE
Payer: MEDICARE

## 2017-07-11 DIAGNOSIS — Z00.00 ROUTINE GENERAL MEDICAL EXAMINATION AT A HEALTH CARE FACILITY: ICD-10-CM

## 2017-07-11 DIAGNOSIS — Z78.0 POSTMENOPAUSAL: ICD-10-CM

## 2017-07-11 PROCEDURE — 77080 DXA BONE DENSITY AXIAL: CPT

## 2017-08-01 ENCOUNTER — TELEPHONE (OUTPATIENT)
Dept: INTERNAL MEDICINE CLINIC | Age: 67
End: 2017-08-01

## 2017-08-01 RX ORDER — FLUTICASONE PROPIONATE 50 MCG
2 SPRAY, SUSPENSION (ML) NASAL DAILY
Qty: 1 BOTTLE | Refills: 3 | Status: CANCELLED | OUTPATIENT
Start: 2017-08-01

## 2017-08-01 NOTE — TELEPHONE ENCOUNTER
Identified patient 2 identifiers verified.  Appointment has been made with Dr. Ricci Daly for  8/2/17 2 pm

## 2017-08-01 NOTE — TELEPHONE ENCOUNTER
Patient states she needs a call back to be advised if something can be called in for Sore & Hoarse Throat or if she needs an appt. Please call to Advise.  Thank you

## 2017-08-01 NOTE — TELEPHONE ENCOUNTER
Identified patient 2 identifiers verified. Patient  Requesting refill on Flonase and  Also wants to know if you can suggest anything for her sore throat nasal drip and horseness.  Patient has been using OTC meds with no relief

## 2017-08-02 ENCOUNTER — OFFICE VISIT (OUTPATIENT)
Dept: INTERNAL MEDICINE CLINIC | Age: 67
End: 2017-08-02

## 2017-08-02 VITALS
HEIGHT: 63 IN | DIASTOLIC BLOOD PRESSURE: 69 MMHG | WEIGHT: 219.4 LBS | BODY MASS INDEX: 38.88 KG/M2 | OXYGEN SATURATION: 97 % | RESPIRATION RATE: 18 BRPM | HEART RATE: 69 BPM | SYSTOLIC BLOOD PRESSURE: 123 MMHG | TEMPERATURE: 98.4 F

## 2017-08-02 DIAGNOSIS — J30.89 NON-SEASONAL ALLERGIC RHINITIS, UNSPECIFIED ALLERGIC RHINITIS TRIGGER: Primary | ICD-10-CM

## 2017-08-02 DIAGNOSIS — J02.9 PHARYNGITIS, UNSPECIFIED ETIOLOGY: ICD-10-CM

## 2017-08-02 LAB
S PYO AG THROAT QL: NEGATIVE
VALID INTERNAL CONTROL?: YES

## 2017-08-02 RX ORDER — FLUTICASONE PROPIONATE 50 MCG
2 SPRAY, SUSPENSION (ML) NASAL DAILY
Qty: 1 BOTTLE | Refills: 3 | Status: SHIPPED | OUTPATIENT
Start: 2017-08-02

## 2017-08-02 NOTE — MR AVS SNAPSHOT
Visit Information Date & Time Provider Department Dept. Phone Encounter #  
 8/2/2017  2:00 PM Timur Gaytan, [address] [phone] [id number] Upcoming Health Maintenance Date Due DTaP/Tdap/Td series (1 - Tdap) 3/4/1969 EYE EXAM RETINAL OR DILATED Q1 11/20/2015 FOOT EXAM Q1 1/15/2016 BREAST CANCER SCRN MAMMOGRAM 4/13/2016 GLAUCOMA SCREENING Q2Y 11/20/2016 INFLUENZA AGE 9 TO ADULT 8/1/2017 HEMOGLOBIN A1C Q6M 12/14/2017 MICROALBUMIN Q1 6/14/2018 LIPID PANEL Q1 6/14/2018 MEDICARE YEARLY EXAM 6/15/2018 COLONOSCOPY 8/23/2026 Allergies as of 8/2/2017  Review Complete On: 8/2/2017 By: Timur Gaytan MD  
  
 Severity Noted Reaction Type Reactions Ampicillin (Bulk)  11/06/2009    Rash Astelin [Azelastine]  08/10/2010   Intolerance Other (comments) NASAL IRRITATION Lipitor [Atorvastatin]  10/30/2009    Other (comments) Myalgias Pravachol [Pravastatin]  11/06/2009    Myalgia Welchol [Colesevelam]  04/23/2012   Side Effect Myalgia Zetia [Ezetimibe]  10/30/2009    Other (comments) Myalgias Zocor [Simvastatin]  10/30/2009    Nausea Only Current Immunizations  Reviewed on 11/10/2011 Name Date Pneumococcal Conjugate (PCV-13) 6/14/2017  1:00 PM  
 Pneumococcal Polysaccharide (PPSV-23) 1/15/2015 Not reviewed this visit You Were Diagnosed With   
  
 Codes Comments Non-seasonal allergic rhinitis, unspecified allergic rhinitis trigger    -  Primary ICD-10-CM: J30.89 ICD-9-CM: 477.8 Pharyngitis, unspecified etiology     ICD-10-CM: J02.9 ICD-9-CM: 318 Vitals BP Pulse Temp Resp Height(growth percentile) Weight(growth percentile) 123/69 (BP 1 Location: Left arm, BP Patient Position: Sitting) 69 98.4 °F (36.9 °C) (Oral) 18 5' 3\" (1.6 m) 219 lb 6.4 oz (99.5 kg) SpO2 BMI OB Status Smoking Status 97% 38.86 kg/m2 Hysterectomy Never Smoker Vitals History BMI and BSA Data Body Mass Index Body Surface Area  
 38.86 kg/m 2 2.1 m 2 Preferred Pharmacy [facility] [address] [phone] Your Updated Medication List  
  
   
This list is accurate as of: 8/2/17  2:58 PM.  Always use your most recent med list.  
  
  
  
  
 ascorbic acid (vitamin C) 500 mg tablet Commonly known as:  VITAMIN C Take  by mouth. aspirin delayed-release 325 mg tablet Take 325 mg by mouth daily. fenofibrate nanocrystallized 48 mg tablet Commonly known as:  TRICOR  
TAKE 1 TABLET BY MOUTH EVERY DAY  
  
 FISH OIL PO Take 1,000 mg by mouth two (2) times a day. fluticasone 50 mcg/actuation nasal spray Commonly known as:  Daksha Martinez 2 Sprays by Both Nostrils route daily. * glucose blood VI test strips strip Commonly known as:  blood glucose test  
Check Blood Sugar 6-7 times a week * glucose blood VI test strips strip Commonly known as:  ASCENSIA AUTODISC VI, ONE TOUCH ULTRA TEST VI Check blood glucose twice daily. LOFIBRA 54 mg tablet Generic drug:  fenofibrate Take  by mouth daily. losartan-hydroCHLOROthiazide 100-25 mg per tablet Commonly known as:  HYZAAR  
TAKE 1 TABLET BY MOUTH EVERY DAY  
  
 MULTIVITAMIN PO Take  by mouth daily. Centrum silver  
  
 raNITIdine 300 mg tablet Commonly known as:  ZANTAC TAKE 1 TABLET BY MOUTH EVERY DAY  
  
 VITAMIN B-6 PO Take  by mouth daily. VITAMIN D3 2,000 unit Tab Generic drug:  cholecalciferol (vitamin D3) Take 2,000 Units by mouth daily. * Notice: This list has 2 medication(s) that are the same as other medications prescribed for you. Read the directions carefully, and ask your doctor or other care provider to review them with you. Prescriptions Sent to Pharmacy  Refills  
 fluticasone (FLONASE) 50 mcg/actuation nasal spray 3  
 Si Sprays by Both Nostrils route daily. Class: Normal  
 Pharmacy: [facility] [address], [address] [place] [address] #: [phone] Route: Both Nostrils We Performed the Following AMB POC RAPID STREP A [57843 CPT(R)] REFERRAL TO ENT-OTOLARYNGOLOGY [QBK40 Custom] Comments:  
 Please evaluate patient for hoarseness Referral Information Referral ID Referred By Referred To  
  
 [id number] Vallie Councilman, MD   
   [address] [facility] [address] Phone: [phone] Fax: [phone] Visits Status Start Date End Date 1 New Request 17 If your referral has a status of pending review or denied, additional information will be sent to support the outcome of this decision. Introducing [facility]! [facility] introduces [employer] patient portal. Now you can access parts of your medical record, email your doctor's office, and request medication refills online. 1. In your internet browser, go to [email or web address] 2. Click on the First Time User? Click Here link in the Sign In box. You will see the New Member Sign Up page. 3. Enter your [employer] Access Code exactly as it appears below. You will not need to use this code after youve completed the sign-up process. If you do not sign up before the expiration date, you must request a new code. · [employer] Access Code: [email or web address] Expires: 2017 12:49 PM 
 
4. Enter the last four digits of your Social Security Number (xxxx) and Date of Birth (mm/dd/yyyy) as indicated and click Submit. You will be taken to the next sign-up page. 5. Create a ShangPint ID. This will be your [employer] login ID and cannot be changed, so think of one that is secure and easy to remember. 6. Create a ShangPint password. You can change your password at any time. 7. Enter your Password Reset Question and Answer. This can be used at a later time if you forget your password. 8. Enter your e-mail address. You will receive e-mail notification when new information is available in [address] 9. Click Sign Up. You can now view and download portions of your medical record. 10. Click the Download Summary menu link to download a portable copy of your medical information. If you have questions, please visit the Frequently Asked Questions section of the DNAdigest website. Remember, DNAdigest is NOT to be used for urgent needs. For medical emergencies, dial 911. Now available from your iPhone and Android! Please provide this summary of care documentation to your next provider. Your primary care clinician is listed as Phan Conway. If you have any questions after today's visit, please call [phone].

## 2017-08-02 NOTE — PROGRESS NOTES
SUBJECTIVE  Ms. John Rodriguez presents today acutely for     Chief Complaint   Patient presents with    Sore Throat     pt here today c.o sore throat/hoarsness x 2 weeks       She is a \"little sore. \"  She has been hoarse. Has nasal drainage; \"I use flonase but I'm out of that. \"   OTC meds: Nyquil, Dayquil, benadryl. Gargling with salt water and peroxide. PMH includes allergic rhinitis. Nonsmoker. OBJECTIVE  Visit Vitals    /69 (BP 1 Location: Left arm, BP Patient Position: Sitting)    Pulse 69    Temp 98.4 °F (36.9 °C) (Oral)    Resp 18    Ht 5' 3\" (1.6 m)    Wt 219 lb 6.4 oz (99.5 kg)    SpO2 97%    BMI 38.86 kg/m2     Gen: Pleasant 69 y.o.  female in NAD.   HEENT: PERRLA. EOMI. OP erythematous.  Neck: Supple.  No LAD.  HEART: RRR, No M/G/R.   LUNGS: CTAB No W/R.   ABDOMEN: S, NT, ND, BS+.   EXTREMITIES: Warm. No C/C/E. SKIN: Warm. Dry. No rashes or other lesions noted. Rapid strep (-)    ASSESSMENT / PLAN    ICD-10-CM ICD-9-CM    1. Non-seasonal allergic rhinitis, unspecified allergic rhinitis trigger J30.89 477.8 fluticasone (FLONASE) 50 mcg/actuation nasal spray   2. Pharyngitis, unspecified etiology J02.9 462 AMB POC RAPID STREP A      REFERRAL TO ENT-OTOLARYNGOLOGY       I have reviewed with the patient details of the assessment and plan and all questions were answered. Relevant patient education was performed. Follow-up Disposition: As planned.

## 2017-08-08 RX ORDER — FENOFIBRATE 48 MG/1
TABLET, COATED ORAL
Qty: 30 TAB | Refills: 0 | Status: SHIPPED | OUTPATIENT
Start: 2017-08-08 | End: 2017-09-12 | Stop reason: SDUPTHER

## 2017-09-12 RX ORDER — FENOFIBRATE 48 MG/1
TABLET, COATED ORAL
Qty: 90 TAB | Refills: 0 | Status: SHIPPED | OUTPATIENT
Start: 2017-09-12 | End: 2017-12-27 | Stop reason: SDUPTHER

## 2017-11-06 RX ORDER — BLOOD SUGAR DIAGNOSTIC
STRIP MISCELLANEOUS
Qty: 100 STRIP | Refills: 0 | Status: SHIPPED | OUTPATIENT
Start: 2017-11-06

## 2017-12-28 RX ORDER — FENOFIBRATE 48 MG/1
TABLET, COATED ORAL
Qty: 90 TAB | Refills: 0 | Status: SHIPPED | OUTPATIENT
Start: 2017-12-28 | End: 2018-04-12 | Stop reason: SDUPTHER

## 2018-01-31 ENCOUNTER — OFFICE VISIT (OUTPATIENT)
Dept: INTERNAL MEDICINE CLINIC | Age: 68
End: 2018-01-31

## 2018-01-31 ENCOUNTER — HOSPITAL ENCOUNTER (OUTPATIENT)
Dept: LAB | Age: 68
Discharge: HOME | End: 2018-01-31
Payer: MEDICARE

## 2018-01-31 VITALS
HEIGHT: 63 IN | TEMPERATURE: 97.6 F | HEART RATE: 75 BPM | DIASTOLIC BLOOD PRESSURE: 80 MMHG | RESPIRATION RATE: 18 BRPM | BODY MASS INDEX: 39.09 KG/M2 | WEIGHT: 220.6 LBS | SYSTOLIC BLOOD PRESSURE: 140 MMHG | OXYGEN SATURATION: 96 %

## 2018-01-31 DIAGNOSIS — E11.9 CONTROLLED TYPE 2 DIABETES MELLITUS WITHOUT COMPLICATION, WITHOUT LONG-TERM CURRENT USE OF INSULIN (HCC): ICD-10-CM

## 2018-01-31 DIAGNOSIS — I10 ESSENTIAL HYPERTENSION, BENIGN: Primary | ICD-10-CM

## 2018-01-31 DIAGNOSIS — E78.2 MIXED HYPERLIPIDEMIA: ICD-10-CM

## 2018-01-31 DIAGNOSIS — Z84.1 FAMILY HISTORY OF KIDNEY DISEASE: ICD-10-CM

## 2018-01-31 DIAGNOSIS — Z82.71 FH POLYCYSTIC KIDNEY: ICD-10-CM

## 2018-01-31 DIAGNOSIS — Z94.0 HISTORY OF KIDNEY TRANSPLANT: ICD-10-CM

## 2018-01-31 PROCEDURE — 80053 COMPREHEN METABOLIC PANEL: CPT

## 2018-01-31 PROCEDURE — 83036 HEMOGLOBIN GLYCOSYLATED A1C: CPT

## 2018-01-31 PROCEDURE — 80061 LIPID PANEL: CPT

## 2018-01-31 PROCEDURE — 36415 COLL VENOUS BLD VENIPUNCTURE: CPT

## 2018-01-31 NOTE — MR AVS SNAPSHOT
[address] [facility] 
[phone] Patient: Santi Rodriguez MRN:  [id number] Visit Information Date & Time Provider Department Dept. Phone Encounter #  
 1/31/2018  2:00 PM Luzmaria Angel, [address] [place] [address],[address] [phone] [id number] Follow-up Instructions Return in about 6 months (around 7/31/2018) for follow up htn and diabetes. Upcoming Health Maintenance Date Due DTaP/Tdap/Td series (1 - Tdap) 3/4/1969 EYE EXAM RETINAL OR DILATED Q1 11/20/2015 FOOT EXAM Q1 1/15/2016 BREAST CANCER SCRN MAMMOGRAM 4/13/2016 GLAUCOMA SCREENING Q2Y 11/20/2016 HEMOGLOBIN A1C Q6M 12/14/2017 MICROALBUMIN Q1 6/14/2018 LIPID PANEL Q1 6/14/2018 MEDICARE YEARLY EXAM 6/15/2018 COLONOSCOPY 8/23/2026 Allergies as of 1/31/2018  Review Complete On: 1/31/2018 By: Luzmaria Angel MD  
  
 Severity Noted Reaction Type Reactions Ampicillin (Bulk)  11/06/2009    Rash Astelin [Azelastine]  08/10/2010   Intolerance Other (comments) NASAL IRRITATION Lipitor [Atorvastatin]  10/30/2009    Other (comments) Myalgias Pravachol [Pravastatin]  11/06/2009    Myalgia Welchol [Colesevelam]  04/23/2012   Side Effect Myalgia Zetia [Ezetimibe]  10/30/2009    Other (comments) Myalgias Zocor [Simvastatin]  10/30/2009    Nausea Only Current Immunizations  Reviewed on 11/10/2011 Name Date Pneumococcal Conjugate (PCV-13) 6/14/2017  1:00 PM  
 Pneumococcal Polysaccharide (PPSV-23) 1/15/2015 Not reviewed this visit You Were Diagnosed With   
  
 Codes Comments Essential hypertension, benign    -  Primary ICD-10-CM: I10 
ICD-9-CM: 401.1 Controlled type 2 diabetes mellitus without complication, without long-term current use of insulin ([facility]     ICD-10-CM: E11.9 ICD-9-CM: 250.00 Mixed hyperlipidemia     ICD-10-CM: E78.2 ICD-9-CM: 272.2 Family history of kidney disease     ICD-10-CM: Z84.1 ICD-9-CM: V18.69 FH polycystic kidney     ICD-10-CM: Z82.71 ICD-9-CM: V18.61 History of kidney transplant     ICD-10-CM: Z94.0 ICD-9-CM: V42.0 Vitals BP Pulse Temp Resp Height(growth percentile) Weight(growth percentile) 140/80 75 97.6 °F (36.4 °C) (Oral) 18 5' 3\" (1.6 m) 220 lb 9.6 oz (100.1 kg) SpO2 BMI OB Status Smoking Status 96% 39.08 kg/m2 Hysterectomy Never Smoker Vitals History BMI and BSA Data Body Mass Index Body Surface Area 39.08 kg/m 2 2.11 m 2 Preferred Pharmacy Pharmacy Name Phone [facility] [address] [place] [address] [phone] Your Updated Medication List  
  
   
This list is accurate as of: 1/31/18  2:43 PM.  Always use your most recent med list.  
  
  
  
  
 ascorbic acid (vitamin C) 500 mg tablet Commonly known as:  VITAMIN C Take  by mouth. aspirin delayed-release 325 mg tablet Take 325 mg by mouth daily. fenofibrate nanocrystallized 48 mg tablet Commonly known as:  TRICOR  
TAKE 1 TABLET BY MOUTH EVERY DAY  
  
 FISH OIL PO Take 1,000 mg by mouth two (2) times a day. fluticasone 50 mcg/actuation nasal spray Commonly known as:  Bri Dereje 2 Sprays by Both Nostrils route daily. * glucose blood VI test strips strip Commonly known as:  blood glucose test  
Check Blood Sugar 6-7 times a week * ONETOUCH ULTRA TEST strip Generic drug:  glucose blood VI test strips USE TO TEST BLOOD SUGAR TWICE DAILY  
  
 LOFIBRA 54 mg tablet Generic drug:  fenofibrate Take  by mouth daily. losartan-hydroCHLOROthiazide 100-25 mg per tablet Commonly known as:  HYZAAR  
TAKE 1 TABLET BY MOUTH EVERY DAY  
  
 MULTIVITAMIN PO Take  by mouth daily. Centrum silver  
  
 raNITIdine 300 mg tablet Commonly known as:  ZANTAC TAKE 1 TABLET BY MOUTH EVERY DAY  
  
 VITAMIN B-6 PO Take  by mouth daily. VITAMIN D3 2,000 unit Tab Generic drug:  cholecalciferol (vitamin D3) Take 2,000 Units by mouth daily. * Notice: This list has 2 medication(s) that are the same as other medications prescribed for you. Read the directions carefully, and ask your doctor or other care provider to review them with you. We Performed the Following HEMOGLOBIN A1C WITH EAG [23221 CPT(R)] LIPID PANEL [55716 CPT(R)] METABOLIC PANEL, COMPREHENSIVE [77257 CPT(R)] Follow-up Instructions Return in about 6 months (around 7/31/2018) for follow up htn and diabetes. To-Do List   
 01/31/2018 Imaging:  US RETROPERITONEUM COMP Introducing [facility]! [facility] introduces As It Is patient portal. Now you can access parts of your medical record, email your doctor's office, and request medication refills online. 1. In your internet browser, go to [email or web address] 2. Click on the First Time User? Click Here link in the Sign In box. You will see the New Member Sign Up page. 3. Enter your As It Is Access Code exactly as it appears below. You will not need to use this code after youve completed the sign-up process. If you do not sign up before the expiration date, you must request a new code. · [employer] Is Access Code: [email or web address] Expires: 5/1/2018  2:42 PM 
 
4. Enter the last four digits of your Social Security Number (xxxx) and Date of Birth (mm/dd/yyyy) as indicated and click Submit. You will be taken to the next sign-up page. 5. Create a [employer] Farm[employer] ID. This will be your As It Is login ID and cannot be changed, so think of one that is secure and easy to remember. 6. Create a As It Is password. You can change your password at any time. 7. Enter your Password Reset Question and Answer. This can be used at a later time if you forget your password. 8. Enter your e-mail address. You will receive e-mail notification when new information is available in [address] 9. Click Sign Up. You can now view and download portions of your medical record. 10. Click the Download Summary menu link to download a portable copy of your medical information. If you have questions, please visit the Frequently Asked Questions section of the GROU.PS website. Remember, GR[employer] is NOT to be used for urgent needs. For medical emergencies, dial 911. Now available from your iPhone and Android! Please provide this summary of care documentation to your next provider. Your primary care clinician is listed as Royce Lucio. If you have any questions after today's visit, please call [phone].

## 2018-01-31 NOTE — PROGRESS NOTES
SUBJECTIVE:   Ms. Rojas Nielsen is a 69 y.o. female who is here for follow up of routine medical issues. Pt reports she had a nice holiday season. HTN: Pt is compliant in taking losartan-HCTZ. Patient denies chest pain, FIGUEROA/SOB, edema, headache, visual changes, dizziness, palpitations or syncope. Pt's BP in the office today was elevated at 161/73. Pt mentions she had salty food last night. Pt reports her BP is usually in the 268A systolic. After some time in the office, the pt's BP was rechecked and found to be 140/80. Dyslipidema: Pt is compliant in taking tricor and lofibra. Patient denies abdominal pain, nausea, vomiting, diarrhea due to the medication. DM: Pt's last HgA1c was 6.6% on 6/14/2017. Pt denies numbness/tingling in her feet. She see Dr. Sarthak Murry, podiatry, for her feet. She previously noticed a cyst on her foot, which is now gone. Pt reports she is not getting regular cardio exercise. Pt's son was recently diagnosed with polycystic kidney disease. Pt is interested in what can be done to evaluate her own health regarding this issue. PREVENTIVE:  Mammogram: pt will schedule  Dexa: current - 7/11/2017    At this time, she is otherwise doing well and has brought no other complaints to my attention today. For a list of the medical issues addressed today, see the assessment and plan below.     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 All: is allergic to ampicillin (bulk); astelin [azelastine]; lipitor [atorvastatin]; pravachol [pravastatin]; welchol [colesevelam]; zetia [ezetimibe]; and zocor [simvastatin]. MEDS:   Current Outpatient Prescriptions   Medication Sig    fenofibrate nanocrystallized (TRICOR) 48 mg tablet TAKE 1 TABLET BY MOUTH EVERY DAY    ONETOUCH ULTRA TEST strip USE TO TEST BLOOD SUGAR TWICE DAILY    losartan-hydroCHLOROthiazide (HYZAAR) 100-25 mg per tablet TAKE 1 TABLET BY MOUTH EVERY DAY    fluticasone (FLONASE) 50 mcg/actuation nasal spray 2 Sprays by Both Nostrils route daily.  ascorbic acid (VITAMIN C) 500 mg tablet Take  by mouth.  glucose blood VI test strips (BLOOD GLUCOSE TEST) strip Check Blood Sugar 6-7 times a week    cholecalciferol, vitamin D3, (VITAMIN D-3) 2,000 unit Tab Take 2,000 Units by mouth daily.  PYRIDOXINE HCL (VITAMIN B-6 PO) Take  by mouth daily.  aspirin delayed-release 325 mg tablet Take 325 mg by mouth daily.  MULTIVITAMINS (MULTIVITAMIN PO) Take  by mouth daily. Centrum silver    DOCOSAHEXANOIC ACID/EPA (FISH OIL PO) Take 1,000 mg by mouth two (2) times a day.  ranitidine (ZANTAC) 300 mg tablet TAKE 1 TABLET BY MOUTH EVERY DAY     No current facility-administered medications for this visit. FH: family history includes Alcohol abuse in her brother; Diabetes in her brother, mother, and sister; Heart Attack in her father; Heart Disease in her father, mother, and sister; Hypertension in her father and mother; Kidney Disease in her sister and sister; Stroke in her mother; Tuberculosis in her brother. SH:  reports that she has never smoked. She has never used smokeless tobacco. She reports that she drinks alcohol. She reports that she does not use illicit drugs.      Review of Systems - History obtained from the patient  General ROS: no fever, chills, fatigue, body aches  Psychological ROS: no change in anxiety, depression, SI/HI  Ophthalmic ROS: no blurred vision, myopia, double vision  ENT ROS: no dysphagia, otalgia, otorrhea, rhinorrhea, post nasal drip  Respiratory ROS: no cough, shortness of breath, or wheezing  Cardiovascular ROS: no chest pain or dyspnea on exertion  Gastrointestinal ROS: no abdominal pain, change in bowel habits, or black or bloody stools  Genito-Urinary ROS: no frequency, urgency, incontinence, dysuria, hematouria  Musculoskeletal ROS: no arthralagia, myalgia  Neurological ROS: no headaches, dizziness, lightheadedness, tremors, seizures  Dermatological ROS: no rash or lesions    OBJECTIVE:   Vitals:   Visit Vitals    /80    Pulse 75    Temp 97.6 °F (36.4 °C) (Oral)    Resp 18    Ht 5' 3\" (1.6 m)    Wt 220 lb 9.6 oz (100.1 kg)    SpO2 96%    BMI 39.08 kg/m2      Gen: Pleasant 69 y.o.  female in NAD. HEENT: PERRLA. EOMI. OP moist and pink. Neck: Supple. No LAD. HEART: RRR, No M/G/R.      LUNGS: CTAB No W/R. ABDOMEN: S, NT, ND, BS+. EXTREMITIES: Warm. No C/C/E.    MUSCULOSKELETAL: Normal ROM, muscle strength 5/5 all groups. NEURO: Alert and oriented x 3. Cranial nerves grossly intact. No focal sensory or motor deficits noted. SKIN: Warm. Dry. No rashes or other lesions noted. ASSESSMENT/ PLAN: Diagnoses and all orders for this visit:    1. Essential hypertension, benign  -     METABOLIC PANEL, COMPREHENSIVE    2. Controlled type 2 diabetes mellitus without complication, without long-term current use of insulin (HCC)  -     METABOLIC PANEL, COMPREHENSIVE  -     HEMOGLOBIN A1C WITH EAG  -     US RETROPERITONEUM COMP; Future    3. Mixed hyperlipidemia  -     LIPID PANEL    4. Family history of kidney disease  -     US RETROPERITONEUM COMP; Future    5. FH polycystic kidney  -     US RETROPERITONEUM COMP; Future    6. History of kidney transplant   -     US RETROPERITONEUM COMP; Future        ICD-10-CM ICD-9-CM    1.  Essential hypertension, benign M30 218.1 METABOLIC PANEL, COMPREHENSIVE   2. Controlled type 2 diabetes mellitus without complication, without long-term current use of insulin (HCC) T47.9 007.31 METABOLIC PANEL, COMPREHENSIVE      HEMOGLOBIN A1C WITH EAG      US RETROPERITONEUM COMP   3. Mixed hyperlipidemia E78.2 272.2 LIPID PANEL   4. Family history of kidney disease Z84.1 V18.69 US RETROPERITONEUM COMP   5. FH polycystic kidney Z82.71 V18.61 US RETROPERITONEUM COMP   6. History of kidney transplant  Z94.0 V42.0 US RETROPERITONEUM COMP      1. Hypertension  I recommended continuing current dose of losartan-HCTZ, eating a low sodium diet, and increasing exercise. Recheck CMP. 2. DM type 2  I advised pt to avoid sugars and starches and to increase exercise when possible. Recheck hemoglobin A1c.     3. Hyperlipidemia   I recommended continuing current dose of tricor, eating a low fat diet, and increasing exercise. Recheck lipid panel. 4. Family hx of kidney disease  I ordered an ultrasound of the retroperitoneum to screen for kidney disease and a CMP. 5. Family hx of polycystic kidney disease  Treatment as in #4.     6. History of Kidney Transplant  Treatment as in #4. Follow-up Disposition:  Return in about 6 months (around 7/31/2018) for follow up htn and diabetes. I have reviewed the patient's medications and risks/side effects/benefits were discussed. Diagnosis(-es) explained to patient and questions answered. Literature provided where appropriate.      Written by Cee Montalvo, as dictated by Rolando Gutirerez MD.

## 2018-01-31 NOTE — PROGRESS NOTES
Chief Complaint   Patient presents with    Diabetes     would like to be checked for polycystic kidney disease     1. Have you been to the ER, urgent care clinic since your last visit? Hospitalized since your last visit? No    2. Have you seen or consulted any other health care providers outside of the [address] [place] [address] since your last visit? Include any pap smears or colon screening.  No

## 2018-02-01 LAB
ALBUMIN SERPL-MCNC: 4.2 G/DL (ref 3.6–4.8)
ALBUMIN/GLOB SERPL: 1.5 {RATIO} (ref 1.2–2.2)
ALP SERPL-CCNC: 88 IU/L (ref 39–117)
ALT SERPL-CCNC: 21 IU/L (ref 0–32)
AST SERPL-CCNC: 19 IU/L (ref 0–40)
BILIRUB SERPL-MCNC: 0.4 MG/DL (ref 0–1.2)
BUN SERPL-MCNC: 13 MG/DL (ref 8–27)
BUN/CREAT SERPL: 19 (ref 12–28)
CALCIUM SERPL-MCNC: 9.1 MG/DL (ref 8.7–10.3)
CHLORIDE SERPL-SCNC: 100 MMOL/L (ref 96–106)
CHOLEST SERPL-MCNC: 205 MG/DL (ref 100–199)
CO2 SERPL-SCNC: 26 MMOL/L (ref 18–29)
CREAT SERPL-MCNC: 0.69 MG/DL (ref 0.57–1)
EST. AVERAGE GLUCOSE BLD GHB EST-MCNC: 137 MG/DL
GFR SERPLBLD CREATININE-BSD FMLA CKD-EPI: 103 ML/MIN/1.73
GFR SERPLBLD CREATININE-BSD FMLA CKD-EPI: 89 ML/MIN/1.73
GLOBULIN SER CALC-MCNC: 2.8 G/DL (ref 1.5–4.5)
GLUCOSE SERPL-MCNC: 106 MG/DL (ref 65–99)
HBA1C MFR BLD: 6.4 % (ref 4.8–5.6)
HDLC SERPL-MCNC: 59 MG/DL
LDLC SERPL CALC-MCNC: 134 MG/DL (ref 0–99)
POTASSIUM SERPL-SCNC: 4.1 MMOL/L (ref 3.5–5.2)
PROT SERPL-MCNC: 7 G/DL (ref 6–8.5)
SODIUM SERPL-SCNC: 141 MMOL/L (ref 134–144)
TRIGL SERPL-MCNC: 60 MG/DL (ref 0–149)
VLDLC SERPL CALC-MCNC: 12 MG/DL (ref 5–40)

## 2018-02-02 NOTE — PROGRESS NOTES
Lipids-Your current lab results reveal a elevated total cholesterol and ldl. Your total cholesterol should be under 200 and your ldl under 100. Work on following a low fat diet and exercise at least three times a week. CMP-Normal electrolyte levels except for a elevation in glucose levels, normal renal, and liver function. A1c-Your current hgbA1c of 6.4 is lower than your last level of 6.6. Keep up the good work! Continue to work on following a diabetic diet and exercise. Recheck this test: hgbA1c  in  3 months. Continue with current  medications.

## 2018-02-05 ENCOUNTER — HOSPITAL ENCOUNTER (OUTPATIENT)
Dept: ULTRASOUND IMAGING | Age: 68
Discharge: HOME | End: 2018-02-05
Attending: FAMILY MEDICINE
Payer: MEDICARE

## 2018-02-05 DIAGNOSIS — Z84.1 FAMILY HISTORY OF KIDNEY DISEASE: ICD-10-CM

## 2018-02-05 DIAGNOSIS — Z94.0 HISTORY OF KIDNEY TRANSPLANT: ICD-10-CM

## 2018-02-05 DIAGNOSIS — Z82.71 FH POLYCYSTIC KIDNEY: ICD-10-CM

## 2018-02-05 DIAGNOSIS — E11.9 CONTROLLED TYPE 2 DIABETES MELLITUS WITHOUT COMPLICATION, WITHOUT LONG-TERM CURRENT USE OF INSULIN (HCC): ICD-10-CM

## 2018-02-05 PROCEDURE — 76770 US EXAM ABDO BACK WALL COMP: CPT

## 2018-02-06 ENCOUNTER — TELEPHONE (OUTPATIENT)
Dept: INTERNAL MEDICINE CLINIC | Age: 68
End: 2018-02-06

## 2018-02-06 NOTE — TELEPHONE ENCOUNTER
Spoke with patient. Two pt identifiers confirmed. Patient has questions about her recent labwork. Patient is confused because her son was recently diagnosed with polycystic kidney disease and labwork does not show that any abnormalities. Explained to patient that heredity does not all the time mean comes from the parents. Inherited diseases/illnesses can come from within any member of the family. Pt verbalized understanding of information discussed w/ no further questions at this time.

## 2018-03-20 ENCOUNTER — TELEPHONE (OUTPATIENT)
Dept: INTERNAL MEDICINE CLINIC | Age: 68
End: 2018-03-20

## 2018-03-20 NOTE — TELEPHONE ENCOUNTER
Call attempted to patient, there was no answer. Left a voice mail message requesting a return call. Patient is due for a mammogram, please inquire if patient receives mammograms from [id number] [facility] [address] [facility] [address] outside source.

## 2018-04-13 RX ORDER — FENOFIBRATE 48 MG/1
TABLET, COATED ORAL
Qty: 90 TAB | Refills: 0 | Status: SHIPPED | OUTPATIENT
Start: 2018-04-13 | End: 2018-08-15 | Stop reason: SDUPTHER

## 2018-06-20 ENCOUNTER — HOSPITAL ENCOUNTER (OUTPATIENT)
Dept: LAB | Age: 68
Discharge: HOME | End: 2018-06-20
Payer: MEDICARE

## 2018-06-20 ENCOUNTER — OFFICE VISIT (OUTPATIENT)
Dept: INTERNAL MEDICINE CLINIC | Age: 68
End: 2018-06-20

## 2018-06-20 VITALS
HEIGHT: 63 IN | HEART RATE: 60 BPM | BODY MASS INDEX: 38.8 KG/M2 | DIASTOLIC BLOOD PRESSURE: 85 MMHG | WEIGHT: 219 LBS | OXYGEN SATURATION: 97 % | RESPIRATION RATE: 16 BRPM | SYSTOLIC BLOOD PRESSURE: 150 MMHG | TEMPERATURE: 97.5 F

## 2018-06-20 DIAGNOSIS — E11.9 CONTROLLED TYPE 2 DIABETES MELLITUS WITHOUT COMPLICATION, WITHOUT LONG-TERM CURRENT USE OF INSULIN (HCC): ICD-10-CM

## 2018-06-20 DIAGNOSIS — Z13.39 SCREENING FOR ALCOHOLISM: ICD-10-CM

## 2018-06-20 DIAGNOSIS — Z23 ENCOUNTER FOR IMMUNIZATION: ICD-10-CM

## 2018-06-20 DIAGNOSIS — Z13.31 SCREENING FOR DEPRESSION: ICD-10-CM

## 2018-06-20 DIAGNOSIS — Z00.00 MEDICARE ANNUAL WELLNESS VISIT, SUBSEQUENT: Primary | ICD-10-CM

## 2018-06-20 DIAGNOSIS — I10 ESSENTIAL HYPERTENSION, BENIGN: ICD-10-CM

## 2018-06-20 DIAGNOSIS — Z13.6 SCREENING FOR ISCHEMIC HEART DISEASE: ICD-10-CM

## 2018-06-20 PROBLEM — E66.01 SEVERE OBESITY (BMI 35.0-39.9): Status: ACTIVE | Noted: 2018-06-20

## 2018-06-20 PROCEDURE — 82043 UR ALBUMIN QUANTITATIVE: CPT

## 2018-06-20 PROCEDURE — 80061 LIPID PANEL: CPT

## 2018-06-20 PROCEDURE — 36415 COLL VENOUS BLD VENIPUNCTURE: CPT

## 2018-06-20 NOTE — MR AVS SNAPSHOT
[address] [facility]. 
[phone] Patient: Marisela Rodriguez MRN:  [id number] Visit Information Date & Time Provider Department Dept. Phone Encounter #  
 6/20/2018  8:45 AM Kaylene Owens, [address] [id number] Follow-up Instructions Return in about 3 months (around 9/20/2018) for follow up diabetes. Upcoming Health Maintenance Date Due DTaP/Tdap/Td series (1 - Tdap) 3/4/1971 FOOT EXAM Q1 1/15/2016 BREAST CANCER SCRN MAMMOGRAM 4/13/2016 GLAUCOMA SCREENING Q2Y 11/20/2016 MICROALBUMIN Q1 6/14/2018 MEDICARE YEARLY EXAM 6/15/2018 EYE EXAM RETINAL OR DILATED Q1 11/30/2018* HEMOGLOBIN A1C Q6M 7/31/2018 Influenza Age 5 to Adult 8/1/2018 LIPID PANEL Q1 1/31/2019 COLONOSCOPY 8/23/2026 *Topic was postponed. The date shown is not the original due date. Allergies as of 6/20/2018  Review Complete On: 6/20/2018 By: Kaylene Owens MD  
  
 Severity Noted Reaction Type Reactions Ampicillin (Bulk)  11/06/2009    Rash Astelin [Azelastine]  08/10/2010   Intolerance Other (comments) NASAL IRRITATION Lipitor [Atorvastatin]  10/30/2009    Other (comments) Myalgias Pravachol [Pravastatin]  11/06/2009    Myalgia Welchol [Colesevelam]  04/23/2012   Side Effect Myalgia Zetia [Ezetimibe]  10/30/2009    Other (comments) Myalgias Zocor [Simvastatin]  10/30/2009    Nausea Only Current Immunizations  Reviewed on 11/10/2011 Name Date Pneumococcal Conjugate (PCV-13) 6/14/2017  1:00 PM  
 Pneumococcal Polysaccharide (PPSV-23) 1/15/2015 Tdap  Incomplete Not reviewed this visit You Were Diagnosed With   
  
 Codes Comments Medicare annual wellness visit, subsequent    -  Primary ICD-10-CM: Z00.00 ICD-9-CM: V70.0 Essential hypertension, benign     ICD-10-CM: I10 
ICD-9-CM: 401.1 Controlled type 2 diabetes mellitus without complication, without long-term current use of insulin ([facility]     ICD-10-CM: E11.9 ICD-9-CM: 250.00 Screening for alcoholism     ICD-10-CM: Z13.89 ICD-9-CM: V79.1 Screening for depression     ICD-10-CM: Z13.89 ICD-9-CM: V79.0 Screening for ischemic heart disease     ICD-10-CM: Z13.6 ICD-9-CM: V81.0 Encounter for immunization     ICD-10-CM: V38 ICD-9-CM: V03.89 Vitals BP Pulse Temp Resp Height(growth percentile) Weight(growth percentile) 150/85 60 97.5 °F (36.4 °C) (Oral) 16 5' 3\" (1.6 m) 219 lb (99.3 kg) SpO2 BMI OB Status Smoking Status 97% 38.79 kg/m2 Hysterectomy Never Smoker Vitals History BMI and BSA Data Body Mass Index Body Surface Area 38.79 kg/m 2 2.1 m 2 Preferred Pharmacy Pharmacy Name Phone [facility] [address] [address] [address] [phone] Your Updated Medication List  
  
   
This list is accurate as of 6/20/18 10:02 AM.  Always use your most recent med list.  
  
  
  
  
 ascorbic acid (vitamin C) 500 mg tablet Commonly known as:  VITAMIN C Take  by mouth. aspirin delayed-release 325 mg tablet Take 325 mg by mouth daily. fenofibrate nanocrystallized 48 mg tablet Commonly known as:  TRICOR  
TAKE 1 TABLET BY MOUTH EVERY DAY  
  
 FISH OIL PO Take 1,000 mg by mouth two (2) times a day. fluticasone 50 mcg/actuation nasal spray Commonly known as:  Alric Eaves 2 Sprays by Both Nostrils route daily. * glucose blood VI test strips strip Commonly known as:  blood glucose test  
Check Blood Sugar 6-7 times a week * ONETOUCH ULTRA TEST strip Generic drug:  glucose blood VI test strips USE TO TEST BLOOD SUGAR TWICE DAILY losartan-hydroCHLOROthiazide 100-25 mg per tablet Commonly known as:  HYZAAR  
TAKE 1 TABLET BY MOUTH EVERY DAY  
  
 MULTIVITAMIN PO  
 Take  by mouth daily. Centrum silver  
  
 raNITIdine 300 mg tablet Commonly known as:  ZANTAC TAKE 1 TABLET BY MOUTH EVERY DAY  
  
 VITAMIN B-6 PO Take  by mouth daily. VITAMIN D3 2,000 unit Tab Generic drug:  cholecalciferol (vitamin D3) Take 2,000 Units by mouth daily. * Notice: This list has 2 medication(s) that are the same as other medications prescribed for you. Read the directions carefully, and ask your doctor or other care provider to review them with you. We Performed the Following [address] [[id number] HCP] LIPID PANEL [82281 CPT(R)] MICROALBUMIN, UR, RAND W/ MICROALB/CREAT RATIO [id number] CPT(R)] [place] ANNUAL ALCOHOL SCREEN 15 MIN [id number] HCP] TETANUS, DIPHTHERIA TOXOIDS AND ACELLULAR PERTUSSIS VACCINE (TDAP), IN INDIVIDS. >=7, IM [id number] CPT(R)] Follow-up Instructions Return in about 3 months (around 9/20/2018) for follow up diabetes. Patient Instructions Medicare Wellness Visit, Female The best way to live healthy is to have a lifestyle where you eat a well-balanced diet, exercise regularly, limit alcohol use, and quit all forms of tobacco/nicotine, if applicable. Regular preventive services are another way to keep healthy. Preventive services (vaccines, screening tests, monitoring & exams) can help personalize your care plan, which helps you manage your own care. Screening tests can find health problems at the earliest stages, when they are easiest to treat. [address] follows the current, evidence-based guidelines published by the [facility] [place] (USPSTF) when recommending preventive services for our patients. Because we follow these guidelines, sometimes recommendations change over time as research supports it. (For example, mammograms used to be recommended annually.  Even though Medicare will still pay for an annual mammogram, the newer guidelines recommend a mammogram every two years for women of average risk.) Of course, you and your provider may decide to screen more often for some diseases, based on your risk and co-morbidities (chronic disease you are already diagnosed with). Preventive services for you include: - Medicare offers their members a free annual wellness visit, which is time for you and your primary care provider to discuss and plan for your preventive service needs. Take advantage of this benefit every year! 
 
-All people over age 72 should receive the recommended pneumonia vaccines. Current USPSTF guidelines recommend a series of two vaccines for the best pneumonia protection.  
 
-All adults should have a yearly flu vaccine and a tetanus vaccine every 10 years. All adults age 61 years should receive a shingles vaccine once in their lifetime.   
 
-A bone mass density test is recommended when a woman turns 65 to screen for osteoporosis. This test is only recommended once as a screening. Some providers will use this same test as a disease monitoring tool if you already have osteoporosis. -All adults age 38-68 years who are overweight should have a diabetes screening test once every three years.  
 
-Other screening tests & preventive services for persons with diabetes include: an eye exam to screen for diabetic retinopathy, a kidney function test, a foot exam, and stricter control over your cholesterol.  
 
-Cardiovascular screening for adults with routine risk involves an electrocardiogram (ECG) at intervals determined by the provider.  
 
-Colorectal cancer screenings should be done for adults age 54-65 years with normal risk. There are a number of acceptable methods of screening for this type of cancer. Each test has its own benefits and drawbacks. Discuss with your provider what is most appropriate for you during your annual wellness visit.  The different tests include: colonoscopy (considered the best screening method), a fecal occult blood test, a fecal DNA test, and sigmoidoscopy. -Breast cancer screenings are recommended every other year for women of normal risk age 54-69 years.  
 
-Cervical cancer screenings for women over age 72 are only recommended with certain risk factors.  
 
-All adults born between [facility] should be screened once for Hepatitis C. Here is a list of your current Health Maintenance items (your personalized list of preventive services) with a due date: 
Health Maintenance Due Topic Date Due  
 DTaP/Tdap/Td  (1 - Tdap) 03/04/1971 [facility] Eye Exam  11/20/2015 [facility] Diabetic Foot Care  01/15/2016  Breast Cancer Screening  04/13/2016  Glaucoma Screening   11/20/2016  Albumin Urine Test  06/14/2018 [facility] Annual Well Visit  06/15/2018 Introducing [facility]! Cathy Garcia introduces [employer] patient portal. Now you can access parts of your medical record, email your doctor's office, and request medication refills online. 1. In your internet browser, go to [email or web address] 2. Click on the First Time User? Click Here link in the Sign In box. You will see the New Member Sign Up page. 3. Enter your [employer] Access Code exactly as it appears below. You will not need to use this code after youve completed the sign-up process. If you do not sign up before the expiration date, you must request a new code. · [employer] Access Code: [email or web address] Expires: 9/18/2018 10:02 AM 
 
4. Enter the last four digits of your Social Security Number (xxxx) and Date of Birth (mm/dd/yyyy) as indicated and click Submit. You will be taken to the next sign-up page. 5. Create a [employer] ID. This will be your [employer] login ID and cannot be changed, so think of one that is secure and easy to remember. 6. Create a [employer] password. You can change your password at any time. 7. Enter your Password Reset Question and Answer. This can be used at a later time if you forget your password. 8. Enter your e-mail address. You will receive e-mail notification when new information is available in [address] 9. Click Sign Up. You can now view and download portions of your medical record. 10. Click the Download Summary menu link to download a portable copy of your medical information. If you have questions, please visit the Frequently Asked Questions section of the [employer] website. Remember, [employer] is NOT to be used for urgent needs. For medical emergencies, dial 911. Now available from your iPhone and Android! Please provide this summary of care documentation to your next provider. Your primary care clinician is listed as William Burgos. If you have any questions after today's visit, please call [phone].

## 2018-06-20 NOTE — PROGRESS NOTES
Reviewed record in preparation for visit and have obtained necessary documentation. Identified pt with two pt identifiers(name and ). No chief complaint on file. Health Maintenance Due   Topic Date Due    DTaP/Tdap/Td  (1 - Tdap) 1969    Eye Exam  2015    Diabetic Foot Care  01/15/2016    Breast Cancer Screening  2016    Glaucoma Screening   2016    Albumin Urine Test  2018    Annual Well Visit  06/15/2018       Ms. Rodriguez has a reminder for a \"due or due soon\" health maintenance. I have asked that she discuss this further with her primary care provider for follow-up on this health maintenance. Coordination of Care Questionnaire:  :     1) Have you been to an emergency room, urgent care clinic since your last visit? No     Hospitalized since your last visit? no             2) Have you seen or consulted any other health care providers outside of [address] [place] [address] since your last visit? no  (Include any pap smears or colon screenings in this section.)    3) In the event something were to happen to you and you were unable to speak on your behalf, do you have an Advance Directive/ Living Will in place stating your wishes? No    Do you have an Advance Directive on file? No    4) Are you interested in receiving information on Advance Directives? No     Patient is accompanied by self I have received verbal consent from Juan Rodriguez to discuss any/all medical information while they are present in the room.

## 2018-06-20 NOTE — PROGRESS NOTES
This is the Subsequent Medicare Annual Wellness Exam, performed 12 months or more after the Initial AWV or the last Subsequent AWV    I have reviewed the patient's medical history in detail and updated the computerized patient record. 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 Christ Haines  04/21/08    negative; f/u 1 yr    HX CARPAL TUNNEL RELEASE  12/19/2017    HX KNEE ARTHROSCOPY Right 1999    HX ORTHOPAEDIC  01/04    trigger finger    HX SHOULDER ARTHROSCOPY      HX Milagro Newton CUFF,CHRONIC  02/2004    Dr. Robi Montoya     Current Outpatient Prescriptions   Medication Sig Dispense Refill    fenofibrate nanocrystallized (TRICOR) 48 mg tablet TAKE 1 TABLET BY MOUTH EVERY DAY 90 Tab 0    ONETOUCH ULTRA TEST strip USE TO TEST BLOOD SUGAR TWICE DAILY 100 Strip 0    losartan-hydroCHLOROthiazide (HYZAAR) 100-25 mg per tablet TAKE 1 TABLET BY MOUTH EVERY DAY 90 Tab 3    fluticasone (FLONASE) 50 mcg/actuation nasal spray 2 Sprays by Both Nostrils route daily. 1 Bottle 3    ranitidine (ZANTAC) 300 mg tablet TAKE 1 TABLET BY MOUTH EVERY DAY 60 Tab 6    ascorbic acid (VITAMIN C) 500 mg tablet Take  by mouth.  glucose blood VI test strips (BLOOD GLUCOSE TEST) strip Check Blood Sugar 6-7 times a week 100 strip 11    cholecalciferol, vitamin D3, (VITAMIN D-3) 2,000 unit Tab Take 2,000 Units by mouth daily.       PYRIDOXINE HCL (VITAMIN B-6 PO) Take  by mouth daily.  aspirin delayed-release 325 mg tablet Take 325 mg by mouth daily.  MULTIVITAMINS (MULTIVITAMIN PO) Take  by mouth daily. Centrum silver      DOCOSAHEXANOIC ACID/EPA (FISH OIL PO) Take 1,000 mg by mouth two (2) times a day.        Allergies   Allergen Reactions    Ampicillin (Bulk) Rash    Astelin [Azelastine] Other (comments)     NASAL IRRITATION    Lipitor [Atorvastatin] Other (comments)     Myalgias      Pravachol [Pravastatin] Myalgia    Welchol [Colesevelam] Myalgia    Zetia [Ezetimibe] Other (comments)     Myalgias      Zocor [Simvastatin] Nausea Only     Family History   Problem Relation Age of Onset   Tam Hypertension Mother    Tam Stroke Mother     Diabetes Mother     Heart Disease Mother     Hypertension Father     Heart Disease Father     Heart Attack Father     Heart Disease Sister     Alcohol abuse Brother     Tuberculosis Brother     Diabetes Sister     Kidney Disease Sister     Kidney Disease Sister     Diabetes Brother      Social History   Substance Use Topics    Smoking status: Never Smoker    Smokeless tobacco: Never Used    Alcohol use 0.0 oz/week     0 Standard drinks or equivalent per week      Comment: occasionally     Patient Active Problem List   Diagnosis Code    Type II or unspecified type diabetes mellitus without mention of complication, not stated as uncontrolled E11.9    Essential hypertension, benign I10    Dyslipidemia E78.5    Low back pain M54.5    DJD (degenerative joint disease), multiple sites M15.9    GERD (gastroesophageal reflux disease) K21.9    Anxiety F41.9    Neck pain M54.2    Seasonal allergic rhinitis J30.2    Vitamin D deficiency E55.9    Obesity E66.9    Severe obesity (BMI 35.0-39.9) ([facility]) E66.01       Depression Risk Factor Screening:     PHQ over the last two weeks 6/20/2018   Little interest or pleasure in doing things Not at all   Feeling down, depressed or hopeless Not at all   Total Score PHQ 2 0     Alcohol Risk Factor Screening: You do not drink alcohol or very rarely. Functional Ability and Level of Safety:   Hearing Loss  Hearing is good. Activities of Daily Living  The home contains: no safety equipment. Patient does total self care    Fall Risk  Fall Risk Assessment, last 12 mths 6/20/2018   Able to walk? Yes   Fall in past 12 months? No   Fall with injury? -   Number of falls in past 12 months -   Fall Risk Score -       Abuse Screen  Patient is not abused    Cognitive Screening   Evaluation of Cognitive Function:  Has your family/caregiver stated any concerns about your memory: no  Normal    Patient Care Team   Patient Care Team:  Freddy Kraft MD as PCP - General (Family Practice)  Emilie Werner MD (Cardiology)  Guanako Gee MD (Gastroenterology)  Irma Chavez MD (Optometry)    Assessment/Plan   Education and counseling provided:  Are appropriate based on today's review and evaluation  End-of-Life planning (with patient's consent)    Diagnoses and all orders for this visit:    1. Medicare annual wellness visit, subsequent    2. Essential hypertension, benign    3. Controlled type 2 diabetes mellitus without complication, without long-term current use of insulin ([facility]    4. Screening for alcoholism  -     Annual  Alcohol Screen 15 min ()    5. Screening for depression  -     Depression Screen Annual    6. Screening for ischemic heart disease  -     Lipid Panel ([id number])    7.  Encounter for immunization  -     Tetanus, Diphtheria Toxoids and Acellular Pertussis Vaccine (TDAP)        Health Maintenance Due   Topic Date Due    DTaP/Tdap/Td series (1 - Tdap) [birth year]/04/1969    EYE EXAM RETINAL OR DILATED Q1  11/20/2015    FOOT EXAM Q1  01/15/2016    BREAST CANCER SCRN MAMMOGRAM  04/13/2016    GLAUCOMA SCREENING Q2Y  11/20/2016    MICROALBUMIN Q1  06/14/2018    MEDICARE YEARLY EXAM  06/15/2018   Colonoscopy: current - 8/23/2016 (Dr. Severo Gaster, repeat in 5yr)  Mammogram: due, last on 4/13/2015  Dexa: current - 7/11/17  Tdap: accepted  Pneumonia vaccine: current, PPSV-23 on 1/15/15, PCV-13 on 6/14/17  Shingrix: declined  Eye Exam: current, due on/after 11/18, cataracts being mo  ADL's and support. Patient lives independently at home. Cardiovascular blood tests. Ordered today. Colorectal cancer screening. Completed 8/23/16. Diabetes screening tests. A1c, microalbumin ordered today. Glaucoma screenings. Done annually with Dr. Wolf Wiggins. Pneumonia vaccine. Advance care planning. Has medical directive at home. States that she wants her sons to make decisions for her. Legally  but not . NN encouraged her to complete a medical directive to confirm that her healthcare agents would be her sons. Provided education on advance care planning and Honoring Choices ACP info card with facilitator contact information. Bone density. Ordered today. Mammography. Done annually at [facility].  Pap tests and pelvic exams.   Prior hysterectomy.     Brief history and med reconciliation completed by MA/LPN and reviewed by MD.

## 2018-06-20 NOTE — PROGRESS NOTES
SUBJECTIVE:   Ms. Benson Cherry is a 77 y.o. female who is here for follow up of routine medical issues. Pt is fasting in the office today. Pt c/o of a red area on her lower back. Pt has applied hydrogen peroxide with no change. DM: Last HgA1c was 6.4. Pt reports she stays active but denies regular exercise. HTN: Pt is compliant in taking losartan-HCTZ. Patient denies chest pain, FIGUEROA/SOB, edema, headache, visual changes, dizziness, palpitations or syncope. Pt's BP in the office today was elevated at 150/85. When pt checks their BP at home they typically get 593L systolic. Pt reports she is watching her salt intake. Pt notes she took an cynthia seltzer this morning to prevent indigestion. Pt notes her allergies are well controlled. Pt reports she is taking multi-silver and vitamin D. Pt notes she does not need refills. PREVENTIVE:  Colonoscopy: current - 8/23/2016 (Dr. Maricruz Marquez, repeat in 5yr)  Mammogram: due, last on 4/13/2015  Dexa: current - 7/11/17  Tdap: accepted  Pneumonia vaccine: current, PPSV-23 on 1/15/15, PCV-13 on 6/14/17  Shingrix: declined  Eye Exam: current, due on/after 11/18, cataracts being monitored     At this time, she is otherwise doing well and has brought no other complaints to my attention today. For a list of the medical issues addressed today, see the assessment and plan below.     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 All: is allergic to ampicillin (bulk); astelin [azelastine]; lipitor [atorvastatin]; pravachol [pravastatin]; welchol [colesevelam]; zetia [ezetimibe]; and zocor [simvastatin]. MEDS:   Current Outpatient Prescriptions   Medication Sig    fenofibrate nanocrystallized (TRICOR) 48 mg tablet TAKE 1 TABLET BY MOUTH EVERY DAY    ONETOUCH ULTRA TEST strip USE TO TEST BLOOD SUGAR TWICE DAILY    losartan-hydroCHLOROthiazide (HYZAAR) 100-25 mg per tablet TAKE 1 TABLET BY MOUTH EVERY DAY    fluticasone (FLONASE) 50 mcg/actuation nasal spray 2 Sprays by Both Nostrils route daily.  ranitidine (ZANTAC) 300 mg tablet TAKE 1 TABLET BY MOUTH EVERY DAY    ascorbic acid (VITAMIN C) 500 mg tablet Take  by mouth.  glucose blood VI test strips (BLOOD GLUCOSE TEST) strip Check Blood Sugar 6-7 times a week    cholecalciferol, vitamin D3, (VITAMIN D-3) 2,000 unit Tab Take 2,000 Units by mouth daily.  PYRIDOXINE HCL (VITAMIN B-6 PO) Take  by mouth daily.  aspirin delayed-release 325 mg tablet Take 325 mg by mouth daily.  MULTIVITAMINS (MULTIVITAMIN PO) Take  by mouth daily. Centrum silver    DOCOSAHEXANOIC ACID/EPA (FISH OIL PO) Take 1,000 mg by mouth two (2) times a day. No current facility-administered medications for this visit. FH: family history includes Alcohol abuse in her brother; Diabetes in her brother, mother, and sister; Heart Attack in her father; Heart Disease in her father, mother, and sister; Hypertension in her father and mother; Kidney Disease in her sister and sister; Stroke in her mother; Tuberculosis in her brother. SH:  reports that she has never smoked. She has never used smokeless tobacco. She reports that she drinks alcohol. She reports that she does not use illicit drugs.      Review of Systems - History obtained from the patient  General ROS: no fever, chills, fatigue, body aches  Psychological ROS: no change in anxiety, depression, SI/HI  Ophthalmic ROS: no blurred vision, myopia, double vision  ENT ROS: no dysphagia, otalgia, otorrhea, rhinorrhea, post nasal drip  Respiratory ROS: no cough, shortness of breath, or wheezing  Cardiovascular ROS: no chest pain or dyspnea on exertion  Gastrointestinal ROS: no abdominal pain, change in bowel habits, or black or bloody stools  Genito-Urinary ROS: no frequency, urgency, incontinence, dysuria, hematouria  Musculoskeletal ROS: no arthralagia, myalgia  Neurological ROS: no headaches, dizziness, lightheadedness, tremors, seizures  Dermatological ROS: erythematous lesion on lower right back     OBJECTIVE:   Vitals:   Visit Vitals    /85    Pulse 60    Temp 97.5 °F (36.4 °C) (Oral)    Resp 16    Ht 5' 3\" (1.6 m)    Wt 219 lb (99.3 kg)    SpO2 97%    BMI 38.79 kg/m2      Gen: Pleasant 77 y.o.  female in NAD. HEENT: +cataracts PERRLA. EOMI. OP moist and pink. Neck: Supple. No LAD. HEART: RRR, No M/G/R.      LUNGS: CTAB No W/R. ABDOMEN: S, NT, ND, BS+. EXTREMITIES: Warm. No C/C/E.    MUSCULOSKELETAL: Normal ROM, muscle strength 5/5 all groups. NEURO: Alert and oriented x 3. Cranial nerves grossly intact. No focal sensory or motor deficits noted. SKIN:   + non-changing pigmented lines on right fingernails  + round hyperpigment area on central lumbar spine, superficial, no erythema, no warmth, dry, non-tender on palpation, no subcutaneous nodule          ASSESSMENT/ PLAN: Diagnoses and all orders for this visit:    1. Medicare annual wellness visit, subsequent    2. Essential hypertension, benign    3. Controlled type 2 diabetes mellitus without complication, without long-term current use of insulin (HCC)  -     MICROALBUMIN, UR, RAND W/ MICROALB/CREAT RATIO    4. Screening for alcoholism  -     Annual  Alcohol Screen 15 min ()    5. Screening for depression  -     Depression Screen Annual    6.  Screening for ischemic heart disease  -     Lipid Panel ([id number])    7. Encounter for immunization  -     Tetanus, Diphtheria Toxoids and Acellular Pertussis Vaccine (TDAP)        ICD-10-CM ICD-9-CM    1. Medicare annual wellness visit, subsequent Z00.00 V70.0    2. Essential hypertension, benign I10 401.1    3. Controlled type 2 diabetes mellitus without complication, without long-term current use of insulin (HCC) E11.9 250.00 MICROALBUMIN, UR, RAND W/ MICROALB/CREAT RATIO   4. Screening for alcoholism Z13.89 V79.1 MS ANNUAL ALCOHOL SCREEN 15 MIN   5. Screening for depression Z13.89 V79.0 DEPRESSION SCREEN ANNUAL   6. Screening for ischemic heart disease Z13.6 V81.0 LIPID PANEL   7. Encounter for immunization Z23 V03.89 TETANUS, DIPHTHERIA TOXOIDS AND ACELLULAR PERTUSSIS VACCINE (TDAP), IN INDIVIDS. >=7, IM      1. Medicare Annual Wellness Visit  See attached note. Pt denies any changes to their hearing, vision, or memory. Pt reports they feel safe in their home and does not have any safety equipment. Pt does not currently have an ACP and would like information to create one.    2. Essential hypertension  BP seems to be controlled. I recommended continuing current dose of losartan-HCTZ, eating a low sodium diet, and increasing exercise. I advised pt to bring her BP cuff to her next visit. 3. DM type 2  I performed a foot exam in the office today. Pt had normal filament sensation. I advised them to avoid sugars and starches and to increase exercise when possible. Recheck microalbumin. 4. Screening for Alcoholism   Pt denies any consumption of EtOH. 5. Screening for Depression  Pt denies any feelings of depression. 6. Screening for Ischemic Heart Disease  Recheck lipid panel. 7. Encounter for immunization  I gave pt an order for her tdap immunization. Follow-up Disposition:  Return in about 3 months (around 9/20/2018) for follow up diabetes.     I have reviewed the patient's medications and risks/side effects/benefits were discussed. Diagnosis(-es) explained to patient and questions answered. Literature provided where appropriate.      Written by Bridgette Ugalde, as dictated by Jj Loya MD.

## 2018-06-20 NOTE — PATIENT INSTRUCTIONS
Medicare Wellness Visit, Female    The best way to live healthy is to have a lifestyle where you eat a well-balanced diet, exercise regularly, limit alcohol use, and quit all forms of tobacco/nicotine, if applicable. Regular preventive services are another way to keep healthy. Preventive services (vaccines, screening tests, monitoring & exams) can help personalize your care plan, which helps you manage your own care. Screening tests can find health problems at the earliest stages, when they are easiest to treat. [facility] follows the current, evidence-based guidelines published by the [facility] ([facility]) when recommending preventive services for our patients. Because we follow these guidelines, sometimes recommendations change over time as research supports it. (For example, mammograms used to be recommended annually. Even though Medicare will still pay for an annual mammogram, the newer guidelines recommend a mammogram every two years for women of average risk.)    Of course, you and your provider may decide to screen more often for some diseases, based on your risk and co-morbidities (chronic disease you are already diagnosed with). Preventive services for you include:    - Medicare offers their members a free annual wellness visit, which is time for you and your primary care provider to discuss and plan for your preventive service needs. Take advantage of this benefit every year!    -All people over age 72 should receive the recommended pneumonia vaccines. Current USPSTF guidelines recommend a series of two vaccines for the best pneumonia protection.     -All adults should have a yearly flu vaccine and a tetanus vaccine every 10 years. All adults age 61 years should receive a shingles vaccine once in their lifetime.      -A bone mass density test is recommended when a woman turns 65 to screen for osteoporosis.  This test is only recommended once as a screening. Some providers will use this same test as a disease monitoring tool if you already have osteoporosis. -All adults age 38-68 years who are overweight should have a diabetes screening test once every three years.     -Other screening tests & preventive services for persons with diabetes include: an eye exam to screen for diabetic retinopathy, a kidney function test, a foot exam, and stricter control over your cholesterol.     -Cardiovascular screening for adults with routine risk involves an electrocardiogram (ECG) at intervals determined by the provider.     -Colorectal cancer screenings should be done for adults age 54-65 years with normal risk. There are a number of acceptable methods of screening for this type of cancer. Each test has its own benefits and drawbacks. Discuss with your provider what is most appropriate for you during your annual wellness visit. The different tests include: colonoscopy (considered the best screening method), a fecal occult blood test, a fecal DNA test, and sigmoidoscopy. -Breast cancer screenings are recommended every other year for women of normal risk age 54-69 years.     -Cervical cancer screenings for women over age 72 are only recommended with certain risk factors.     -All adults born between [facility] should be screened once for Hepatitis C.      Here is a list of your current Health Maintenance items (your personalized list of preventive services) with a due date:  Health Maintenance Due   Topic Date Due    DTaP/Tdap/Td  (1 - Tdap) 03/04/1969    Eye Exam  11/20/2015    Diabetic Foot Care  01/15/2016    Breast Cancer Screening  04/13/2016    Glaucoma Screening   11/20/2016    Albumin Urine Test  06/14/2018    Annual Well Visit  06/15/2018

## 2018-06-21 LAB
ALBUMIN/CREAT UR: <5.1 MG/G CREAT (ref 0–30)
CHOLEST SERPL-MCNC: 215 MG/DL (ref 100–199)
CREAT UR-MCNC: 58.5 MG/DL
HDLC SERPL-MCNC: 65 MG/DL
LDLC SERPL CALC-MCNC: 137 MG/DL (ref 0–99)
MICROALBUMIN UR-MCNC: <3 UG/ML
TRIGL SERPL-MCNC: 66 MG/DL (ref 0–149)
VLDLC SERPL CALC-MCNC: 13 MG/DL (ref 5–40)

## 2018-06-22 NOTE — PROGRESS NOTES
Lipids-Your current lab results reveal a elevated total cholesterol and ldl. Your total cholesterol should be under 200 and your ldl under 100. Work on following a low fat diet and exercise at least three times a week.    microalbumin-normal

## 2018-08-16 RX ORDER — FENOFIBRATE 48 MG/1
TABLET, COATED ORAL
Qty: 90 TAB | Refills: 0 | Status: SHIPPED | OUTPATIENT
Start: 2018-08-16 | End: 2018-12-26 | Stop reason: SDUPTHER

## 2018-12-27 RX ORDER — FENOFIBRATE 48 MG/1
TABLET, COATED ORAL
Qty: 90 TAB | Refills: 0 | Status: SHIPPED | OUTPATIENT
Start: 2018-12-27 | End: 2019-03-04 | Stop reason: SDUPTHER

## 2019-03-04 ENCOUNTER — HOSPITAL ENCOUNTER (OUTPATIENT)
Dept: LAB | Age: 69
Discharge: HOME | End: 2019-03-04
Payer: MEDICARE

## 2019-03-04 ENCOUNTER — OFFICE VISIT (OUTPATIENT)
Dept: INTERNAL MEDICINE CLINIC | Age: 69
End: 2019-03-04

## 2019-03-04 ENCOUNTER — TELEPHONE (OUTPATIENT)
Dept: INTERNAL MEDICINE CLINIC | Age: 69
End: 2019-03-04

## 2019-03-04 VITALS
BODY MASS INDEX: 38.62 KG/M2 | SYSTOLIC BLOOD PRESSURE: 161 MMHG | HEART RATE: 68 BPM | HEIGHT: 63 IN | TEMPERATURE: 97.8 F | OXYGEN SATURATION: 97 % | WEIGHT: 218 LBS | RESPIRATION RATE: 18 BRPM | DIASTOLIC BLOOD PRESSURE: 75 MMHG

## 2019-03-04 DIAGNOSIS — I10 ESSENTIAL HYPERTENSION, BENIGN: Primary | ICD-10-CM

## 2019-03-04 DIAGNOSIS — E78.5 DYSLIPIDEMIA: ICD-10-CM

## 2019-03-04 DIAGNOSIS — R73.09 ELEVATED HEMOGLOBIN A1C: ICD-10-CM

## 2019-03-04 DIAGNOSIS — Z12.31 SCREENING MAMMOGRAM, ENCOUNTER FOR: ICD-10-CM

## 2019-03-04 PROCEDURE — 82043 UR ALBUMIN QUANTITATIVE: CPT

## 2019-03-04 PROCEDURE — 80053 COMPREHEN METABOLIC PANEL: CPT

## 2019-03-04 PROCEDURE — 80061 LIPID PANEL: CPT

## 2019-03-04 PROCEDURE — 36415 COLL VENOUS BLD VENIPUNCTURE: CPT

## 2019-03-04 PROCEDURE — 83036 HEMOGLOBIN GLYCOSYLATED A1C: CPT

## 2019-03-04 RX ORDER — FENOFIBRATE 48 MG/1
TABLET, COATED ORAL
Qty: 90 TAB | Refills: 1 | Status: SHIPPED | OUTPATIENT
Start: 2019-03-04 | End: 2019-09-21 | Stop reason: SDUPTHER

## 2019-03-04 NOTE — TELEPHONE ENCOUNTER
Identified patient 2 identifiers verified. Patient given MYchart code  And last 2 Hemoglobin A1C results.

## 2019-03-04 NOTE — PROGRESS NOTES
SUBJECTIVE:  
Ms. Polina Costello is a 68 y.o. female who is here for follow up of routine medical issues. Pt is fasting in the office today. Pt c/o pain between her L fourth and fifth toes. Pt believes it is a callous. She is applying hydrogen peroxide and a cream. She has scheduled an appointment with podiatry for further evaluation. HTN: Pt's BP in the office today was elevated at 161/75. Pt reports when she checks her BP at home it is typically 865G-995P systolic. Pt is compliant in taking losartan-HCTZ. Pt reports her SOB is improving. Patient denies chest pain, edema, headache, visual changes, dizziness, palpitations or syncope. HLD: Pt states the pharmacy has not received her tricor prescription and she is currently out of the medication. Patient denies abdominal pain, nausea, vomiting, diarrhea, arthralgias/myalgias due to the medication. Pt's 6/18 lipid panel revealed elevated total cholesterol (215) and LDL (137). Pre-DM: Pt endorses monitoring her diet for healthy choices. She has limited her simple carbohydrates. Pt endorses staying active with walking a dog. She notes her son has been discussing exercise regimens with her. Pt's 1/18 A1c was 6.4%. PREVENTIVE: 
Colonoscopy: 8/23/16, Dr. Zac Benson, [address] f/u Mammogram: 4/13/18 Dexa: 7/11/17 Pneumonia vaccine: PPSV-23: 1/15/15, PCV-13: 6/14/17 Eye Exam: scheduled 11/19 At this time, she is otherwise doing well and has brought no other complaints to my attention today. For a list of the medical issues addressed today, see the assessment and plan below. PMH:  
Past Medical History:  
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  
 
PSH:  has a past surgical history that includes hx total abdominal hysterectomy (1994); pr repair rotator cuff,chronic (02/2004); hc mammo diag bilat (04/21/08); endoscopy, colon, diagnostic (10/02/00); hx orthopaedic (01/04); hx knee arthroscopy (Right, 1999); hx shoulder arthroscopy; hx barry and bso; colorectal scrn; hi risk ind (8/23/2016); colonoscopy (N/A, 8/23/2016); and hx carpal tunnel release (12/19/2017). All: is allergic to ampicillin (bulk); astelin [azelastine]; lipitor [atorvastatin]; pravachol [pravastatin]; welchol [colesevelam]; zetia [ezetimibe]; and zocor [simvastatin]. MEDS:  
Current Outpatient Medications Medication Sig  
 fenofibrate nanocrystallized (TRICOR) 48 mg tablet TAKE 1 TABLET BY MOUTH EVERY DAY  losartan-hydroCHLOROthiazide (HYZAAR) 100-25 mg per tablet TAKE 1 TABLET BY MOUTH EVERY DAY  
 ONETOUCH ULTRA TEST strip USE TO TEST BLOOD SUGAR TWICE DAILY  fluticasone (FLONASE) 50 mcg/actuation nasal spray 2 Sprays by Both Nostrils route daily.  ranitidine (ZANTAC) 300 mg tablet TAKE 1 TABLET BY MOUTH EVERY DAY  ascorbic acid (VITAMIN C) 500 mg tablet Take  by mouth.  glucose blood VI test strips (BLOOD GLUCOSE TEST) strip Check Blood Sugar 6-7 times a week  cholecalciferol, vitamin D3, (VITAMIN D-3) 2,000 unit Tab Take 2,000 Units by mouth daily.  PYRIDOXINE HCL (VITAMIN B-6 PO) Take  by mouth daily.  aspirin delayed-release 325 mg tablet Take 325 mg by mouth daily.  MULTIVITAMINS (MULTIVITAMIN PO) Take  by mouth daily. Centrum silver  DOCOSAHEXANOIC ACID/EPA (FISH OIL PO) Take 1,000 mg by mouth two (2) times a day. No current facility-administered medications for this visit. FH: family history includes Alcohol abuse in her brother; Diabetes in her brother, mother, and sister; Heart Attack in her father; Heart Disease in her father, mother, and sister; Hypertension in her father and mother; Kidney Disease in her sister and sister; Stroke in her mother; Tuberculosis in her brother. SH:  reports that  has never smoked. she has never used smokeless tobacco. She reports that she drinks alcohol. She reports that she does not use drugs. Review of Systems - History obtained from the patient General ROS: no fever, chills, fatigue, body aches Psychological ROS: no change in anxiety, depression, SI/HI Ophthalmic ROS: no blurred vision, myopia, double vision ENT ROS: no dysphagia, otalgia, otorrhea, rhinorrhea, post nasal drip Respiratory ROS: no cough, shortness of breath, or wheezing Cardiovascular ROS: no chest pain or dyspnea on exertion Gastrointestinal ROS: no abdominal pain, change in bowel habits, or black or bloody stools Genito-Urinary ROS: no frequency, urgency, incontinence, dysuria, hematuria Musculoskeletal ROS: no arthralgia, myalgia Neurological ROS: no headaches, dizziness, lightheadedness, tremors, seizures Dermatological ROS: callous L fourth and fifth toes no rash or lesions OBJECTIVE:  
Vitals:  
Visit Vitals /75 (BP 1 Location: Left arm, BP Patient Position: Sitting) Pulse 68 Temp 97.8 °F (36.6 °C) (Oral) Resp 18 Ht 5' 3\" (1.6 m) Wt 218 lb (98.9 kg) SpO2 97% BMI 38.62 kg/m² Gen: Pleasant 68 y.o.  female in NAD. HEENT: PERRLA. EOMI. OP moist and pink. Neck: Supple. No LAD. HEART: RRR, No M/G/R.     
LUNGS: CTAB No W/R. ABDOMEN: S, NT, ND, BS+. EXTREMITIES: Warm. No C/C/E.   
MUSCULOSKELETAL: Normal ROM, muscle strength 5/5 all groups. NEURO: Alert and oriented x 3. Cranial nerves grossly intact. No focal sensory or motor deficits noted. SKIN: Warm. Dry. No rashes or other lesions noted. Desquamation between 4th and 5th toes on the left foot. ASSESSMENT/ PLAN: Diagnoses and all orders for this visit: 1. Essential hypertension, benign -     METABOLIC PANEL, COMPREHENSIVE 2.  Dyslipidemia 
-     fenofibrate nanocrystallized (TRICOR) 48 mg tablet; TAKE 1 TABLET BY MOUTH EVERY DAY 
 -     LIPID PANEL AND CHOL/HDL RATIO 3. Elevated hemoglobin A1c 
-     MICROALBUMIN, UR, RAND W/ MICROALB/CREAT RATIO 
-     HEMOGLOBIN A1C WITH EAG 4. Screening mammogram, encounter for -     MISSY 3D ADAN W MAMMO BI SCREENING INCL CAD; Future ICD-10-CM ICD-9-CM 1. Essential hypertension, benign R92 368.2 METABOLIC PANEL, COMPREHENSIVE 2. Dyslipidemia E78.5 272.4 fenofibrate nanocrystallized (TRICOR) 48 mg tablet LIPID PANEL AND CHOL/HDL RATIO 3. Elevated hemoglobin A1c R73.09 790.29 MICROALBUMIN, UR, RAND W/ MICROALB/CREAT RATIO  
   HEMOGLOBIN A1C WITH EAG 4. Screening mammogram, encounter for Z12.31 V76.12 MISSY 3D ADAN W MAMMO BI SCREENING INCL CAD 1. Hypertension Pt requested to try lifestyle changes to better control her BP before adjusting medications. I advised her to set a weight loss goal of 10lbs. I asked pt to continue to monitor her BP at home and follow up with readings to ensure she is in a safe range. I recommended continuing current dose of losartan-HCTZ, eating a low sodium diet, and increasing exercise. Recheck CMP. 2. Hyperlipidemia Lipid panel shows elevated total cholesterol and LDL. I recommended continuing current dose of tricor, eating a low fat diet, and increasing exercise. Recheck lipid panel. 3. Elevated hemoglobin A1c I advised pt to avoid sugars and starches and increase exercise when possible. Recheck A1c, microalbumin. 4. Screening mammogram 
Pt is due for a mammogram on/after 4/13/19. Order given. I recommended pt apply polysporin to her foot in the interim before seeing podiatry. Follow-up Disposition: 
Return in about 6 months (around 9/4/2019) for follow up htn. I have reviewed the patient's medications and risks/side effects/benefits were discussed. Diagnosis(-es) explained to patient and questions answered. Literature provided where appropriate.   
 
Written by Dotty Lorenz, as dictated by Kenny Ness MD.

## 2019-03-04 NOTE — PROGRESS NOTES
Reviewed record in preparation for visit and have obtained necessary documentation. Identified pt with two pt identifiers(name and ). Chief Complaint Patient presents with  Shortness of Breath Health Maintenance Due Topic Date Due  
 DTaP/Tdap/Td  (1 - Tdap) 1969  Shingles Vaccine (1 of 2) 1998 [facility] Diabetic Foot Care  01/15/2016  Glaucoma Screening   2016  Eye Exam  2016  Hemoglobin A1C    2018  Flu Vaccine  2018  Breast Cancer Screening  2019 Ms. Rodriguez has a reminder for a \"due or due soon\" health maintenance. I have asked that she discuss this further with her primary care provider for follow-up on this health maintenance. Coordination of Care Questionnaire: 
:  
 
1) Have you been to an emergency room, urgent care clinic since your last visit? no  
 
Hospitalized since your last visit? no          
 
2) Have you seen or consulted any other health care providers outside of [address] [place] [address] since your last visit? no  (Include any pap smears or colon screenings in this section.) 3) In the event something were to happen to you and you were unable to speak on your behalf, do you have an Advance Directive/ Living Will in place stating your wishes? NO Do you have an Advance Directive on file? no 
 
4) Are you interested in receiving information on Advance Directives? YES Patient is accompanied by self I have received verbal consent from [facility] to discuss any/all medical information while they are present in the room.

## 2019-03-05 LAB
ALBUMIN SERPL-MCNC: 4.4 G/DL (ref 3.5–4.8)
ALBUMIN/CREAT UR: 5 MG/G CREAT (ref 0–30)
ALBUMIN/GLOB SERPL: 1.8 {RATIO} (ref 1.2–2.2)
ALP SERPL-CCNC: 103 IU/L (ref 39–117)
ALT SERPL-CCNC: 34 IU/L (ref 0–32)
AST SERPL-CCNC: 33 IU/L (ref 0–40)
BILIRUB SERPL-MCNC: 0.3 MG/DL (ref 0–1.2)
BUN SERPL-MCNC: 14 MG/DL (ref 8–27)
BUN/CREAT SERPL: 20 (ref 12–28)
CALCIUM SERPL-MCNC: 9.4 MG/DL (ref 8.7–10.3)
CHLORIDE SERPL-SCNC: 102 MMOL/L (ref 96–106)
CHOLEST SERPL-MCNC: 214 MG/DL (ref 100–199)
CHOLEST/HDLC SERPL: 3.5 RATIO (ref 0–4.4)
CO2 SERPL-SCNC: 25 MMOL/L (ref 20–29)
CREAT SERPL-MCNC: 0.69 MG/DL (ref 0.57–1)
CREAT UR-MCNC: 109.4 MG/DL
EST. AVERAGE GLUCOSE BLD GHB EST-MCNC: 140 MG/DL
GLOBULIN SER CALC-MCNC: 2.5 G/DL (ref 1.5–4.5)
GLUCOSE SERPL-MCNC: 132 MG/DL (ref 65–99)
HBA1C MFR BLD: 6.5 % (ref 4.8–5.6)
HDLC SERPL-MCNC: 61 MG/DL
LDLC SERPL CALC-MCNC: 144 MG/DL (ref 0–99)
MICROALBUMIN UR-MCNC: 5.5 UG/ML
POTASSIUM SERPL-SCNC: 4.1 MMOL/L (ref 3.5–5.2)
PROT SERPL-MCNC: 6.9 G/DL (ref 6–8.5)
SODIUM SERPL-SCNC: 141 MMOL/L (ref 134–144)
TRIGL SERPL-MCNC: 46 MG/DL (ref 0–149)
VLDLC SERPL CALC-MCNC: 9 MG/DL (ref 5–40)

## 2019-03-06 ENCOUNTER — TELEPHONE (OUTPATIENT)
Dept: INTERNAL MEDICINE CLINIC | Age: 69
End: 2019-03-06

## 2019-03-06 NOTE — TELEPHONE ENCOUNTER
Karla/[facility]. States she received an order for the patient to receive a 3D Mammogram & they do not offer that service. Please call.  Thank you

## 2019-03-06 NOTE — TELEPHONE ENCOUNTER
Call completed to patient, two identifiers verified. Advised mammogram order was faxed to [place] per request. Understanding verbalized.

## 2019-03-06 NOTE — TELEPHONE ENCOUNTER
Pt calling stating that she would like her mammogram order sent to [facility] to have it done there. She also stated that if she has to have it done through [facility] that she would like to know and she will do it through them instead. Best contact [phone].        Message received & copied from [facility]

## 2019-03-07 NOTE — TELEPHONE ENCOUNTER
Message was left for this patient about mammogram screening could be done at [facility] doctor's but not at [place].

## 2019-03-08 ENCOUNTER — TELEPHONE (OUTPATIENT)
Dept: INTERNAL MEDICINE CLINIC | Age: 69
End: 2019-03-08

## 2019-03-13 NOTE — PROGRESS NOTES
CMP-Normal electrolyte levels except for an elevation in the glucose level, normal renal and liver function  Lipids-Your current lab results reveal a elevated total cholesterol and ldl. Your total cholesterol should be under 200 and your ldl under 100. Work on following a low fat diet and exercise at least three times a week.    A1c-Slight elevation  Your last level was 6.4

## 2019-05-02 ENCOUNTER — HOSPITAL ENCOUNTER (OUTPATIENT)
Dept: MAMMOGRAPHY | Age: 69
Discharge: HOME | End: 2019-05-02
Attending: FAMILY MEDICINE
Payer: MEDICARE

## 2019-05-02 DIAGNOSIS — Z12.31 SCREENING MAMMOGRAM, ENCOUNTER FOR: ICD-10-CM

## 2019-05-02 PROCEDURE — 77063 BREAST TOMOSYNTHESIS BI: CPT

## 2019-09-25 RX ORDER — LOSARTAN POTASSIUM AND HYDROCHLOROTHIAZIDE 25; 100 MG/1; MG/1
TABLET ORAL
Qty: 90 TAB | Refills: 0 | Status: SHIPPED | OUTPATIENT
Start: 2019-09-25 | End: 2019-12-20

## 2019-12-20 RX ORDER — LOSARTAN POTASSIUM AND HYDROCHLOROTHIAZIDE 25; 100 MG/1; MG/1
TABLET ORAL
Qty: 90 TAB | Refills: 0 | Status: SHIPPED | OUTPATIENT
Start: 2019-12-20 | End: 2020-03-06 | Stop reason: ALTCHOICE

## 2019-12-24 DIAGNOSIS — I10 ESSENTIAL HYPERTENSION, BENIGN: Primary | ICD-10-CM

## 2019-12-24 NOTE — TELEPHONE ENCOUNTER
PCP: William Larson MD    Last appt: 3/4/2019  No future appointments.     Requested Prescriptions      No prescriptions requested or ordered in this encounter

## 2019-12-26 RX ORDER — HYDROCHLOROTHIAZIDE 25 MG/1
25 TABLET ORAL DAILY
Qty: 90 TAB | Refills: 1 | Status: SHIPPED | OUTPATIENT
Start: 2019-12-26 | End: 2020-03-06 | Stop reason: ALTCHOICE

## 2019-12-26 RX ORDER — LOSARTAN POTASSIUM 100 MG/1
100 TABLET ORAL DAILY
Qty: 90 TAB | Refills: 1 | Status: SHIPPED | OUTPATIENT
Start: 2019-12-26 | End: 2020-03-06 | Stop reason: ALTCHOICE

## 2020-01-22 ENCOUNTER — TELEPHONE (OUTPATIENT)
Dept: INTERNAL MEDICINE CLINIC | Age: 70
End: 2020-01-22

## 2020-01-22 NOTE — TELEPHONE ENCOUNTER
Caller's first and last name: Jared Quintanilla from [facility] intern       Reason for call: would need the  Dr to fax a progress note from 12/3/18 to 12/1/19 just one progress note that accesses the Diabetes       Callback required yes/no and why:       Best contact number(s): contact [phone] fax [phone]       Details to clarify the request: this is urgent and due by 1/23/20 this is for Medicare part B .  This has been requested before       March Carp  / [facility]

## 2020-01-22 NOTE — TELEPHONE ENCOUNTER
----- Message from Dayana Mazariegos sent at 1/22/2020  2:11 PM EST -----  Regarding: Nina Smith MD/ telphone  General Message/Vendor Calls    Caller's first and last name: Lesly Mehta from [facility] intern      Reason for call: need progress notes where the pts diabetes was accessed and if there is a  need for testing supplies only one progress note is needed from between 12/3/18 -12/01/2019      Callback required yes/no and why:      Best contact number(s): contact [phone] fax [phone]      Details to clarify the request:      Dayana Mazariegos      Copy/paste envera

## 2020-02-11 ENCOUNTER — TELEPHONE (OUTPATIENT)
Dept: INTERNAL MEDICINE CLINIC | Age: 70
End: 2020-02-11

## 2020-02-11 NOTE — TELEPHONE ENCOUNTER
Patient states she needs a call back in reference to getting something called in for a ulcer in her mouth or an appt or to be advised what to do. Please call.  Thank you

## 2020-02-11 NOTE — TELEPHONE ENCOUNTER
Called, spoke with Pt. Two pt identifiers confirmed. Pt offered and accepted acute visit for Wednesday, February 12, 2020 09:30 AM w/ Dr. Damion Nath. Pt verbalized understanding of information discussed w/ no further questions at this time.

## 2020-02-12 ENCOUNTER — OFFICE VISIT (OUTPATIENT)
Dept: INTERNAL MEDICINE CLINIC | Age: 70
End: 2020-02-12

## 2020-02-12 VITALS
BODY MASS INDEX: 38.77 KG/M2 | HEIGHT: 63 IN | OXYGEN SATURATION: 96 % | TEMPERATURE: 97.8 F | DIASTOLIC BLOOD PRESSURE: 76 MMHG | WEIGHT: 218.8 LBS | RESPIRATION RATE: 18 BRPM | SYSTOLIC BLOOD PRESSURE: 144 MMHG | HEART RATE: 66 BPM

## 2020-02-12 DIAGNOSIS — K13.79 PALATAL LESION: Primary | ICD-10-CM

## 2020-02-12 NOTE — PATIENT INSTRUCTIONS
Office Policies    Phone calls/patient messages:            Please allow up to 24 hours for someone in the office to contact you about your call or message. Be mindful your provider may be out of the office or your message may require further review. We encourage you to use [employer] for your messages as this is a faster, more efficient way to communicate with our office                         Medication Refills:            Prescription medications require 48-72 business hours to process. We encourage you to use [employer] for your refills. For controlled medications: Please allow 72 business hours to process. Certain medications may require you to  a written prescription at our office. NO narcotic/controlled medications will be prescribed after 4pm Monday through Friday or on weekends              Form/Paperwork Completion:            Please note a $25 fee may incur for all paperwork for completed by our providers. We ask that you allow 7-10 business days. Pre-payment is due prior to picking up/faxing the completed form. You may also download your forms to [employer] to have your doctor print off.    1. Have you been to the ER, urgent care clinic since your last visit? Hospitalized since your last visit?no    2. Have you seen or consulted any other health care providers outside of the [address] [place] [address] since your last visit? Include any pap smears or colon screening.  No

## 2020-02-12 NOTE — PROGRESS NOTES
SUBJECTIVE  Ms. Luzma Rodriguez presents today acutely for     Chief Complaint   Patient presents with    Mouth Lesions     pt here today concerned of ulcer in her mouth x 1 week     She has had a painful \"ulcer\" of anterior palate near the gums of her front teeth. It has been present about a week. OBJECTIVE  Visit Vitals  /77 (BP 1 Location: Left arm, BP Patient Position: Sitting)   Pulse 75   Temp 97.8 °F (36.6 °C) (Oral)   Resp 18   Ht 5' 3\" (1.6 m)   Wt 218 lb 12.8 oz (99.2 kg)   SpO2 96%   BMI 38.76 kg/m²     Gen: Pleasant 68 y.o.  female in NAD.   HEENT: PERRLA. EOMI. OP moist and pink. The tender anterior palatal lesion in question is about 2 mm, round, raised, and NOT ulcerated.   Neck: Supple.  No LAD.      ASSESSMENT / PLAN    ICD-10-CM ICD-9-CM    1. Palatal lesion--if it was previously ulcerated, then it is healing. K13.79 528.9 Continue oral care--consider adding rinses with warm salt water, etc.   REFERRAL TO ENT-OTOLARYNGOLOGY, particularly if it doesn't go away in the next week or two. I have reviewed with the patient details of the assessment and plan and all questions were answered. Relevant patient education was performed.

## 2020-03-06 ENCOUNTER — HOSPITAL ENCOUNTER (OUTPATIENT)
Dept: LAB | Age: 70
Discharge: HOME | End: 2020-03-06
Payer: MEDICARE

## 2020-03-06 ENCOUNTER — OFFICE VISIT (OUTPATIENT)
Dept: INTERNAL MEDICINE CLINIC | Age: 70
End: 2020-03-06

## 2020-03-06 VITALS
HEIGHT: 63 IN | DIASTOLIC BLOOD PRESSURE: 76 MMHG | HEART RATE: 66 BPM | WEIGHT: 219 LBS | SYSTOLIC BLOOD PRESSURE: 169 MMHG | TEMPERATURE: 97.7 F | OXYGEN SATURATION: 98 % | BODY MASS INDEX: 38.8 KG/M2 | RESPIRATION RATE: 16 BRPM

## 2020-03-06 DIAGNOSIS — I10 ESSENTIAL HYPERTENSION, BENIGN: ICD-10-CM

## 2020-03-06 DIAGNOSIS — Z12.31 SCREENING MAMMOGRAM, ENCOUNTER FOR: ICD-10-CM

## 2020-03-06 DIAGNOSIS — Z13.39 SCREENING FOR ALCOHOLISM: ICD-10-CM

## 2020-03-06 DIAGNOSIS — J01.00 ACUTE NON-RECURRENT MAXILLARY SINUSITIS: ICD-10-CM

## 2020-03-06 DIAGNOSIS — Z00.00 MEDICARE ANNUAL WELLNESS VISIT, SUBSEQUENT: Primary | ICD-10-CM

## 2020-03-06 DIAGNOSIS — E11.9 CONTROLLED TYPE 2 DIABETES MELLITUS WITHOUT COMPLICATION, WITHOUT LONG-TERM CURRENT USE OF INSULIN (HCC): ICD-10-CM

## 2020-03-06 DIAGNOSIS — Z13.6 SCREENING FOR ISCHEMIC HEART DISEASE: ICD-10-CM

## 2020-03-06 DIAGNOSIS — Z13.31 SCREENING FOR DEPRESSION: ICD-10-CM

## 2020-03-06 PROCEDURE — 82043 UR ALBUMIN QUANTITATIVE: CPT

## 2020-03-06 PROCEDURE — 36415 COLL VENOUS BLD VENIPUNCTURE: CPT

## 2020-03-06 PROCEDURE — 80061 LIPID PANEL: CPT

## 2020-03-06 PROCEDURE — 83036 HEMOGLOBIN GLYCOSYLATED A1C: CPT

## 2020-03-06 RX ORDER — OLMESARTAN MEDOXOMIL AND HYDROCHLOROTHIAZIDE 20/12.5 20; 12.5 MG/1; MG/1
1 TABLET ORAL DAILY
Qty: 30 TAB | Refills: 1 | Status: SHIPPED | OUTPATIENT
Start: 2020-03-06 | End: 2020-05-04

## 2020-03-06 RX ORDER — AZITHROMYCIN 250 MG/1
250 TABLET, FILM COATED ORAL SEE ADMIN INSTRUCTIONS
Qty: 6 TAB | Refills: 0 | Status: SHIPPED | OUTPATIENT
Start: 2020-03-06 | End: 2020-03-11

## 2020-03-06 NOTE — PROGRESS NOTES
SUBJECTIVE:   Ms. Edwin Yusuf is a 65 y.o. female who is here for her MAW. HTN: Pt's BP in the office today was elevated at 164/84, but has not taken her medication. Pt is not compliant in taking Hyzaar 100-25 mg per tablet because she is concerned about the recalls. She occasionally takes the medication. Patient denies chest pain, FIGUEROA/SOB, edema, headache, visual changes, dizziness, palpitations or syncope. Her SBP ranges from 130-140 at home. She is not exercising regularly. DM: Patient denies numbness/tingling in extremities, fatigue, dizziness, polydipsia, polyuria, polyphagia, pancreatitis, increased sugar consumption, sore/bleeding gums, blurred vision, or cuts that will not heal. Last HgA1c was 6.5% on 3/04/2019. HLD: Pt is compliant in taking Tricor 48 mg tablet every day. Patient denies abdominal pain, nausea, vomiting, diarrhea, arthralgias/myalgias due to the medication. Last lipid panel on 3/04/2019 discussed with pt showed elevated total cholesterol (214), nl triglycerides (46), and elevated LDL (144). Pt reports that she is currently experiencing significant congestion and drainage. Pt endorses mild maxillary sinus pressure. She has been using Flonase for sx management. She notes that she has been to podiatry and is scheduled for orthopedics and ENT visits. She states that she does stretches for her lower back pain and is able to manage it pretty well. PREVENTIVE:  Colonoscopy: 8/23/2016 (Dr. Mayuri Dodson, 5 yr f/u)  Mammogram: Ordered. (5/02/2019, negative)  Dexa: 7/11/2017, normal  PCV-13: 6/14/2017  PPSV-23: 1/15/2015  Flu: declines  Eye Exam: 12/2019  ACP: Pt reports that she has the forms at home but has not completed it. At this time, she is otherwise doing well and has brought no other complaints to my attention today. For a list of the medical issues addressed today, see the assessment and plan below.     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 All: is allergic to ampicillin (bulk); astelin [azelastine]; lipitor [atorvastatin]; pravachol [pravastatin]; welchol [colesevelam]; zetia [ezetimibe]; and zocor [simvastatin]. MEDS:   Current Outpatient Medications   Medication Sig    olmesartan-hydroCHLOROthiazide (BENICAR HCT) 20-12.5 mg per tablet Take 1 Tab by mouth daily.  azithromycin (ZITHROMAX) 250 mg tablet Take 1 Tab by mouth See Admin Instructions for 5 days.  fenofibrate nanocrystallized (TRICOR) 48 mg tablet TAKE 1 TABLET BY MOUTH EVERY DAY    glucose blood VI test strips (ASCENSIA AUTODISC VI, ONE TOUCH ULTRA TEST VI) strip One touch test strips to test blood  Glucose twice daily E11.9    ONETOUCH ULTRA TEST strip USE TO TEST BLOOD SUGAR TWICE DAILY    fluticasone (FLONASE) 50 mcg/actuation nasal spray 2 Sprays by Both Nostrils route daily.  ascorbic acid (VITAMIN C) 500 mg tablet Take  by mouth.  glucose blood VI test strips (BLOOD GLUCOSE TEST) strip Check Blood Sugar 6-7 times a week    cholecalciferol, vitamin D3, (VITAMIN D-3) 2,000 unit Tab Take 2,000 Units by mouth daily.  PYRIDOXINE HCL (VITAMIN B-6 PO) Take  by mouth daily.  aspirin delayed-release 325 mg tablet Take 325 mg by mouth daily.     MULTIVITAMINS (MULTIVITAMIN PO) Take  by mouth daily. Centrum silver    DOCOSAHEXANOIC ACID/EPA (FISH OIL PO) Take 1,000 mg by mouth two (2) times a day.  ranitidine (ZANTAC) 300 mg tablet TAKE 1 TABLET BY MOUTH EVERY DAY     No current facility-administered medications for this visit. FH: family history includes Alcohol abuse in her brother; Diabetes in her brother, mother, and sister; Heart Attack in her father; Heart Disease in her father, mother, and sister; Hypertension in her father and mother; Kidney Disease in her sister and sister; Stroke in her mother; Tuberculosis in her brother. SH:  reports that she has never smoked. She has never used smokeless tobacco. She reports current alcohol use. She reports that she does not use drugs. Review of Systems - History obtained from the patient  General ROS: no fever, chills, fatigue, body aches  Psychological ROS: no change in anxiety, depression, SI/HI  Ophthalmic ROS: no blurred vision, myopia, double vision  ENT ROS: +nasal congestion, drainage, mild maxillary sinus pressure. Respiratory ROS: no cough, shortness of breath, or wheezing  Cardiovascular ROS: no chest pain or dyspnea on exertion  Gastrointestinal ROS: no abdominal pain, change in bowel habits, or black or bloody stools  Genito-Urinary ROS: no frequency, urgency, incontinence, dysuria, hematuria  Musculoskeletal ROS: no arthralgia, myalgia  Neurological ROS: no headaches, dizziness, lightheadedness, tremors, seizures  Dermatological ROS: no rash or lesions    OBJECTIVE:   Vitals:   Visit Vitals  /84 (BP 1 Location: Left arm, BP Patient Position: Sitting)   Pulse 66   Temp 97.7 °F (36.5 °C) (Oral)   Resp 16   Ht 5' 3\" (1.6 m)   Wt 219 lb (99.3 kg)   SpO2 98%   BMI 38.79 kg/m²      Gen: Pleasant 65 y.o.  female in NAD. HEENT: +turbinates swollen bilaterally. +mild maxillary and frontal sinus TTP. OP moist and pink. EARS: TMs normal and canals equal bilaterally. NECK: Supple. No LAD. No carotid bruits.   HEART: RRR, No M/G/R.      LUNGS: CTAB No W/R. ABDOMEN: S, NT, ND, BS+. EXTREMITIES: Warm. No C/C/E.    MUSCULOSKELETAL: Normal ROM, muscle strength 5/5 all groups. NEURO: Alert and oriented x 3. Cranial nerves grossly intact. No focal sensory or motor deficits noted. SKIN: Warm. Dry. No rashes or other lesions noted. ASSESSMENT/ PLAN: Diagnoses and all orders for this visit:    1. Medicare annual wellness visit, subsequent  -     [place] ANNUAL ALCOHOL SCREEN [address]  -     MISSY 3D ADAN W MAMMO BI SCREENING INCL CAD; Future    2. Essential hypertension, benign  -     olmesartan-hydroCHLOROthiazide (BENICAR HCT) 20-12.5 mg per tablet; Take 1 Tab by mouth daily. 3. Controlled type 2 diabetes mellitus without complication, without long-term current use of insulin (HCC)  -     HEMOGLOBIN A1C WITH EAG  -     MICROALBUMIN, UR, RAND W/ MICROALB/CREAT RATIO    4. Screening for alcoholism  -     [place] ANNUAL ALCOHOL SCREEN 15 MIN    5. Screening for depression  -     DEPRESSION SCREEN ANNUAL    6. Screening for ischemic heart disease  -     LIPID PANEL    7. Acute non-recurrent maxillary sinusitis  -     azithromycin (ZITHROMAX) 250 mg tablet; Take 1 Tab by mouth See Admin Instructions for 5 days. 8. Screening mammogram, encounter for  -     MISSY 3D ADAN W MAMMO BI SCREENING INCL CAD; Future        ICD-10-CM ICD-9-CM    1. Medicare annual wellness visit, subsequent Z00.00 V70.0 [place] ANNUAL ALCOHOL SCREEN 13 MIN      DEPRESSION SCREEN ANNUAL      MISSY 3D ADAN W MAMMO BI SCREENING INCL CAD   2. Essential hypertension, benign I10 401.1 olmesartan-hydroCHLOROthiazide (BENICAR HCT) 20-12.5 mg per tablet   3. Controlled type 2 diabetes mellitus without complication, without long-term current use of insulin (HCC) E11.9 250.00 HEMOGLOBIN A1C WITH EAG      MICROALBUMIN, UR, RAND W/ MICROALB/CREAT RATIO   4. Screening for alcoholism Z13.39 V79.1 HI ANNUAL ALCOHOL SCREEN 15 MIN   5.  Screening for depression Z13.31 V79.0 DEPRESSION SCREEN ANNUAL   6. Screening for ischemic heart disease Z13.6 V81.0 LIPID PANEL   7. Acute non-recurrent maxillary sinusitis J01.00 461.0 azithromycin (ZITHROMAX) 250 mg tablet   8. Screening mammogram, encounter for Z12.31 V76.12 MISSY 3D ADAN W MAMMO BI SCREENING INCL CAD      1. Medicare Annual Wellness Visit  See attached note. 2. Essential Hypertension  Pt is non-compliant with her BP medication. I recommended starting Benicar-HCT 20-12.5 mg per tablet every day, eating a low sodium diet, and increasing exercise. I encouraged pt to check her BP at home regularly as she begins the new medication. Recheck CMP. 3. Controlled DM Type 2  Pt's blood sugar seems to be well controlled. I advised pt to avoid sugars and starches and to increase exercise when possible. Recheck hemoglobin A1c and Microalbumin. 4. Screening for Alcoholism  See attached note. 5. Screening for Depression  See attached note. 6. Screening for Ischemic Heart Disease  Recheck lipid panel. 7. Acute Non-Recurrent Maxillary Sinusitis  I prescribed azithromycin 250 mg tablet for 5 days for treatment. 8. Screening Mammogram  I ordered a mammogram for health maintenance. Follow-up and Dispositions    · Return in about 1 year (around 3/6/2021) for annual wellness and 6 mo follow up htn/diabetes. I have reviewed the patient's medications and risks/side effects/benefits were discussed. Diagnosis(-es) explained to patient and questions answered. Literature provided where appropriate.      Written by Tiffanie Pavon, as dictated by Anderson Mead MD.

## 2020-03-06 NOTE — PROGRESS NOTES
This is the Subsequent Medicare Annual Wellness Exam, performed 12 months or more after the Initial AWV or the last Subsequent AWV    I have reviewed the patient's medical history in detail and updated the computerized patient record. History     Patient Active Problem List   Diagnosis Code    Type II or unspecified type diabetes mellitus without mention of complication, not stated as uncontrolled E11.9    Essential hypertension, benign I10    Dyslipidemia E78.5    Low back pain M54.5    DJD (degenerative joint disease), multiple sites M15.9    GERD (gastroesophageal reflux disease) K21.9    Anxiety F41.9    Neck pain M54.2    Seasonal allergic rhinitis J30.2    Vitamin D deficiency E55.9    Obesity E66.9    Severe obesity (BMI 35.0-39. 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 Yue Montalvo  04/21/08    negative; f/u 1 yr    HX BREAST BIOPSY Left 1988    benign    HX CARPAL TUNNEL RELEASE  12/19/2017    HX CARPAL TUNNEL RELEASE Right 12/2019    Dr. Matthew Moreland ARTHROSCOPY Right 1999    HX ORTHOPAEDIC  01/04    trigger finger    HX SHOULDER ARTHROSCOPY      HX [place] CUFF,CHRONIC  02/2004    Dr. Meghan Tate     Current Outpatient Medications   Medication Sig Dispense Refill    losartan (COZAAR) 100 mg tablet Take 1 Tab by mouth daily.  90 Tab 1    fenofibrate nanocrystallized (TRICOR) 48 mg tablet TAKE 1 TABLET BY MOUTH EVERY DAY 90 Tab 1    glucose blood VI test strips (ASCENSIA AUTODISC VI, ONE TOUCH ULTRA TEST VI) strip One touch test strips to test blood  Glucose twice daily E11.9 100 Strip 3    ONETOUCH ULTRA TEST strip USE TO TEST BLOOD SUGAR TWICE DAILY 100 Strip 0    fluticasone (FLONASE) 50 mcg/actuation nasal spray 2 Sprays by Both Nostrils route daily. 1 Bottle 3    ascorbic acid (VITAMIN C) 500 mg tablet Take  by mouth.  glucose blood VI test strips (BLOOD GLUCOSE TEST) strip Check Blood Sugar 6-7 times a week 100 strip 11    cholecalciferol, vitamin D3, (VITAMIN D-3) 2,000 unit Tab Take 2,000 Units by mouth daily.  PYRIDOXINE HCL (VITAMIN B-6 PO) Take  by mouth daily.  aspirin delayed-release 325 mg tablet Take 325 mg by mouth daily.  MULTIVITAMINS (MULTIVITAMIN PO) Take  by mouth daily. Centrum silver      DOCOSAHEXANOIC ACID/EPA (FISH OIL PO) Take 1,000 mg by mouth two (2) times a day.  hydroCHLOROthiazide (HYDRODIURIL) 25 mg tablet Take 1 Tab by mouth daily.  90 Tab 1    losartan-hydroCHLOROthiazide (HYZAAR) 100-25 mg per tablet TAKE 1 TABLET BY MOUTH EVERY DAY 90 Tab 0    ranitidine (ZANTAC) 300 mg tablet TAKE 1 TABLET BY MOUTH EVERY DAY 60 Tab 6     Allergies   Allergen Reactions    Ampicillin (Bulk) Rash    Astelin [Azelastine] Other (comments)     NASAL IRRITATION    Lipitor [Atorvastatin] Other (comments)     Myalgias      Pravachol [Pravastatin] Myalgia    Welchol [Colesevelam] Myalgia    Zetia [Ezetimibe] Other (comments)     Myalgias      Zocor [Simvastatin] Nausea Only       Family History   Problem Relation Age of Onset    Hypertension Mother     Stroke Mother    Anjali Delong Diabetes Mother     Heart Disease Mother     Hypertension Father     Heart Disease Father     Heart Attack Father     Heart Disease Sister     Alcohol abuse Brother     Tuberculosis Brother     Diabetes Sister     Kidney Disease Sister     Kidney Disease Sister  Diabetes Brother      Social History     Tobacco Use    Smoking status: Never Smoker    Smokeless tobacco: Never Used   Substance Use Topics    Alcohol use: Yes     Alcohol/week: 0.0 standard drinks     Comment: occasionally       Depression Risk Factor Screening:     3 most recent PHQ Screens 3/6/2020   Little interest or pleasure in doing things Not at all   Feeling down, depressed, irritable, or hopeless Not at all   Total Score PHQ 2 0       Alcohol Risk Factor Screening:   Do you average 1 drink per night or more than 7 drinks a week:  No    On any one occasion in the past three months have you have had more than 3 drinks containing alcohol:  No      Functional Ability and Level of Safety:   Hearing: Hearing is good. Activities of Daily Living: The home contains: no safety equipment. Patient does total self care    Ambulation: with no difficulty    Fall Risk:  Fall Risk Assessment, last 12 mths 3/6/2020   Able to walk? Yes   Fall in past 12 months? No   Fall with injury? -   Number of falls in past 12 months -   Fall Risk Score -       Abuse Screen:  Patient is not abused    Cognitive Screening   Has your family/caregiver stated any concerns about your memory: no      Patient Care Team   Patient Care Team:  Christine Kelly MD as PCP - General (Family Practice)  Christine Kelly MD as PCP - [facility] Empaneled Provider  Leonardo Hudson MD (Cardiology)  Meryle Mealing., MD (Gastroenterology)  Harjit Ji MD (Optometry)    Assessment/Plan   Education and counseling provided:  Are appropriate based on today's review and evaluation  End-of-Life planning (with patient's consent)  Influenza Vaccine  Screening Mammography  Cardiovascular screening blood test  Screening for glaucoma    Diagnoses and all orders for this visit:    1. Medicare annual wellness visit, subsequent  -     [place] ANNUAL ALCOHOL SCREEN [facility]    2.  Essential hypertension, benign    3. Controlled type 2 diabetes mellitus without complication, without long-term current use of insulin ([facility]    4. Screening for alcoholism  -     [place] ANNUAL ALCOHOL SCREEN 15 MIN    5. Screening for depression  -     DEPRESSION SCREEN ANNUAL    6. Screening for ischemic heart disease  -     LIPID PANEL        Health Maintenance Due   Topic Date Due    DTaP/Tdap/Td series (1 - Tdap) 03[birth year]04/1961    Shingrix Vaccine Age 50> (1 of 2) 03/04/1998    Foot Exam Q1  01/15/2016    GLAUCOMA SCREENING Q2Y  11/20/2016    Eye Exam Retinal or Dilated  11/20/2016    Medicare Yearly Exam  06/21/2019    Influenza Age 5 to Adult  08/01/2019    A1C test (Diabetic or Prediabetic)  03/04/2020    MICROALBUMIN Q1  03/04/2020    Lipid Screen  03/04/2020     PREVENTIVE:  Colonoscopy: 8/23/2016 (Dr. Grady Colin, 5 yr f/u)  Mammogram: Ordered. (5/02/2019, negative)  Dexa: 7/11/2017, normal  PCV-13: 6/14/2017  PPSV-23: 1/15/2015  Flu: declines  Eye Exam: 12/2019  ACP: ACP planning continued today. I explained the purpose of the advance medical directive. Patient expressed interest in reviewing material.  I provided the patient with a \"Your Right to Decide\" booklet, [employer] Kit, and a copy of an Advance Directive.   Patient agrees to providing a copy to the office when available.

## 2020-03-06 NOTE — PROGRESS NOTES
Identified pt with two pt identifiers(name and ). Reviewed record in preparation for visit and have obtained necessary documentation. Chief Complaint   Patient presents with   Ramez Hernandez Annual Wellness Visit        Visit Vitals  /76 (BP 1 Location: Left arm, BP Patient Position: Sitting)   Pulse 66   Temp 97.7 °F (36.5 °C) (Oral)   Resp 16   Ht 5' 3\" (1.6 m)   Wt 219 lb (99.3 kg)   SpO2 98%   BMI 38.79 kg/m²       Health Maintenance Due   Topic    DTaP/Tdap/Td series (1 - Tdap)    Shingrix Vaccine Age 50> (1 of 2)    Foot Exam Q1     GLAUCOMA SCREENING Q2Y     Eye Exam Retinal or Dilated     Medicare Yearly Exam     Influenza Age 5 to Adult     A1C test (Diabetic or Prediabetic)     MICROALBUMIN Q1     Lipid Screen        Med Reconciliation: Completed    Coordination of Care Questionnaire:  :   1) Have you been to an emergency room, urgent care, or hospitalized since your last visit? If yes, where when, and reason for visit? No       2. Have seen or consulted any other health care provider since your last visit? If yes, where when, and reason for visit? Dr. Antonio Bolanos (ENT)       3) Do you have an Advanced Directive/ Living Will in place? No  If yes, do we have a copy on file   If no, would you like information     Patient is accompanied by self I have received verbal consent from Pj MARTINEZ Minor to discuss any/all medical information while they are present in the room.

## 2020-03-06 NOTE — PATIENT INSTRUCTIONS
Medicare Wellness Visit, Female The best way to live healthy is to have a lifestyle where you eat a well-balanced diet, exercise regularly, limit alcohol use, and quit all forms of tobacco/nicotine, if applicable. Regular preventive services are another way to keep healthy. Preventive services (vaccines, screening tests, monitoring & exams) can help personalize your care plan, which helps you manage your own care. Screening tests can find health problems at the earliest stages, when they are easiest to treat. [facility] follows the current, evidence-based guidelines published by the [facility] ([facility]) when recommending preventive services for our patients. Because we follow these guidelines, sometimes recommendations change over time as research supports it. (For example, mammograms used to be recommended annually. Even though Medicare will still pay for an annual mammogram, the newer guidelines recommend a mammogram every two years for women of average risk). Of course, you and your doctor may decide to screen more often for some diseases, based on your risk and your co-morbidities (chronic disease you are already diagnosed with). Preventive services for you include: - Medicare offers their members a free annual wellness visit, which is time for you and your primary care provider to discuss and plan for your preventive service needs. Take advantage of this benefit every year! 
-All adults over the age of 72 should receive the recommended pneumonia vaccines. Current USPSTF guidelines recommend a series of two vaccines for the best pneumonia protection.  
-All adults should have a flu vaccine yearly and a tetanus vaccine every 10 years.  
-All adults age 48 and older should receive the shingles vaccines (series of two vaccines). -All adults age 38-68 who are overweight should have a diabetes screening test once every three years. -All adults born between [birth year] and 1967 should be screened once for Hepatitis C. 
-Other screening tests and preventive services for persons with diabetes include: an eye exam to screen for diabetic retinopathy, a kidney function test, a foot exam, and stricter control over your cholesterol.  
-Cardiovascular screening for adults with routine risk involves an electrocardiogram (ECG) at intervals determined by your doctor.  
-Colorectal cancer screenings should be done for adults age 54-65 with no increased risk factors for colorectal cancer. There are a number of acceptable methods of screening for this type of cancer. Each test has its own benefits and drawbacks. Discuss with your doctor what is most appropriate for you during your annual wellness visit. The different tests include: colonoscopy (considered the best screening method), a fecal occult blood test, a fecal DNA test, and sigmoidoscopy. 
 
-A bone mass density test is recommended when a woman turns 65 to screen for osteoporosis. This test is only recommended one time, as a screening. Some providers will use this same test as a disease monitoring tool if you already have osteoporosis. -Breast cancer screenings are recommended every other year for women of normal risk, age 54-69. 
-Cervical cancer screenings for women over age 72 are only recommended with certain risk factors. Here is a list of your current Health Maintenance items (your personalized list of preventive services) with a due date: 
Health Maintenance Due Topic Date Due  
 DTaP/Tdap/Td  (1 - Tdap) [birth year]04/1961  Shingles Vaccine (1 of 2) 03/04/1998 [address] [place] [address] Diabetic Foot Care  01/15/2016  Glaucoma Screening   11/20/2016  Eye Exam  11/20/2016 [address] [place] [address] Annual Well Visit  06/21/2019  Flu Vaccine  08/01/2019  Hemoglobin A1C    03/04/2020  Albumin Urine Test  03/04/2020  Cholesterol Test   03/04/2020

## 2020-03-07 LAB
ALBUMIN/CREAT UR: 24 MG/G CREAT (ref 0–29)
CHOLEST SERPL-MCNC: 208 MG/DL (ref 100–199)
CREAT UR-MCNC: 108 MG/DL
EST. AVERAGE GLUCOSE BLD GHB EST-MCNC: 146 MG/DL
HBA1C MFR BLD: 6.7 % (ref 4.8–5.6)
HDLC SERPL-MCNC: 57 MG/DL
LDLC SERPL CALC-MCNC: 137 MG/DL (ref 0–99)
MICROALBUMIN UR-MCNC: 25.8 UG/ML
TRIGL SERPL-MCNC: 68 MG/DL (ref 0–149)
VLDLC SERPL CALC-MCNC: 14 MG/DL (ref 5–40)

## 2020-03-20 NOTE — PROGRESS NOTES
Lipids: Panel is improved compared to levels checked 1 year ago. The total cholesterol and the LDL. The total cholesterol should be under 200, HDL over 40, triglycerides under 150 ,and LDL less than 100  A1c: Hemoglobin A1c has increased from 6. 5.to 6.7 in the past year. Please work on following a diabetic diet and exercise as tolerated 3 times a week.

## 2020-03-23 ENCOUNTER — TELEPHONE (OUTPATIENT)
Dept: INTERNAL MEDICINE CLINIC | Age: 70
End: 2020-03-23

## 2020-05-30 DIAGNOSIS — E78.5 DYSLIPIDEMIA: ICD-10-CM

## 2020-06-01 RX ORDER — FENOFIBRATE 48 MG/1
TABLET, COATED ORAL
Qty: 90 TAB | Refills: 1 | Status: SHIPPED | OUTPATIENT
Start: 2020-06-01 | End: 2021-03-22

## 2020-06-19 ENCOUNTER — HOSPITAL ENCOUNTER (OUTPATIENT)
Dept: MAMMOGRAPHY | Age: 70
Discharge: HOME | End: 2020-06-19
Attending: FAMILY MEDICINE
Payer: MEDICARE

## 2020-06-19 DIAGNOSIS — Z00.00 MEDICARE ANNUAL WELLNESS VISIT, SUBSEQUENT: ICD-10-CM

## 2020-06-19 DIAGNOSIS — Z12.31 SCREENING MAMMOGRAM, ENCOUNTER FOR: ICD-10-CM

## 2020-06-19 PROCEDURE — 77063 BREAST TOMOSYNTHESIS BI: CPT

## 2020-06-22 ENCOUNTER — VIRTUAL VISIT (OUTPATIENT)
Dept: INTERNAL MEDICINE CLINIC | Age: 70
End: 2020-06-22

## 2020-06-22 DIAGNOSIS — R23.8 SKIN IRRITATION: Primary | ICD-10-CM

## 2020-06-22 NOTE — PROGRESS NOTES
Identified pt with two pt identifiers(name and ). Reviewed record in preparation for visit and have obtained necessary documentation. Chief Complaint   Patient presents with    Skin Problem     Pt reports blister like bumps on both arms. Health Maintenance Due   Topic    DTaP/Tdap/Td series (1 - Tdap)    Shingrix Vaccine Age 50> (1 of 2)    Foot Exam Q1     GLAUCOMA SCREENING Q2Y     Eye Exam Retinal or Dilated        Med Reconciliation: Completed    Coordination of Care Questionnaire:  :   1) Have you been to an emergency room, urgent care, or hospitalized since your last visit? If yes, where when, and reason for visit? No       2. Have seen or consulted any other health care provider since your last visit? If yes, where when, and reason for visit? No       3) Do you have an Advanced Directive/ Living Will in place? No  If yes, do we have a copy on file   If no, would you like information       This is an established visit conducted via telemedicine. The patient has been instructed that this meets HIPAA criteria and acknowledges and agrees to this method of visitation.     Kentrell Nash  20  2:28 PM

## 2020-06-22 NOTE — PROGRESS NOTES
Maxime Rodriguez is a 65 y.o. female who was seen by synchronous (real-time) audio-video technology on 6/22/2020. Consent: Maxime Rodriguez, who was seen by synchronous (real-time) audio-video technology, and/or her healthcare decision maker, is aware that this patient-initiated, Telehealth encounter on 6/22/2020 is a billable service, with coverage as determined by her insurance carrier. She is aware that she may receive a bill and has provided verbal consent to proceed: Yes. Assessment & Plan:   Diagnoses and all orders for this visit:    1. Skin irritation    Patient has small piece blisterlike lesions on her forearms. Rash is on the dorsal side of her arm is mildly erythematous. She was advised to continue using the Benadryl tablets and Benadryl cream.  I also recommended she use a topical triple antibiotic ointment and cortisone cream.  If there is no improvement in the rash Thursday of this week she was advised to call the office. .    Subjective:   Maxime Rodriguez is a 65 y.o. female who was seen for Skin Problem (Pt reports blister like bumps on both arms. )    Ms. Rodriguez comes in for video visit for evaluation of lesions on her arm. She reports over the past weekend she developed a small blister like lesions on her forearms. She reports the areas itching with no significant pain. He is a taking Benadryl by mouth and applied Benadryl cream to the area. She is not sure what may have caused this breakout on her arms  Prior to Admission medications    Medication Sig Start Date End Date Taking?  Authorizing Provider   fenofibrate nanocrystallized (TRICOR) 48 mg tablet TAKE 1 TABLET BY MOUTH EVERY DAY 6/1/20  Yes Pat Mayorga MD   olmesartan-hydroCHLOROthiazide [facility] HCT) 20-12.5 mg per tablet TAKE 1 TABLET BY MOUTH DAILY 5/4/20  Yes Pat Mayorga MD   glucose blood VI test strips (ASCENSIA AUTODISC VI, ONE TOUCH ULTRA TEST VI) strip One touch test strips to test blood  Glucose twice daily E11.9 6/26/19  Yes Mi Sanchez MD   [facility] ULTRA TEST strip USE TO TEST BLOOD SUGAR TWICE DAILY 11/6/17  Yes Josephine Jason MD   fluticasone Rivka Ann) 50 mcg/actuation nasal spray 2 Sprays by Both Nostrils route daily. 8/2/17  Yes Jo Ann Medellin MD   ascorbic acid (VITAMIN C) 500 mg tablet Take  by mouth. Yes Provider, Historical   glucose blood VI test strips (BLOOD GLUCOSE TEST) strip Check Blood Sugar 6-7 times a week 1/15/15  Yes Mi Sanchez MD   cholecalciferol, vitamin D3, (VITAMIN D-3) 2,000 unit Tab Take 2,000 Units by mouth daily. 2/16/11  Yes Provider, Historical   PYRIDOXINE HCL (VITAMIN B-6 PO) Take  by mouth daily. 2/3/11  Yes Provider, Historical   aspirin delayed-release 325 mg tablet Take 325 mg by mouth daily. 3/5/10  Yes Provider, Historical   MULTIVITAMINS (MULTIVITAMIN PO) Take  by mouth daily. Centrum silver   Yes Provider, Historical   DOCOSAHEXANOIC ACID/EPA (FISH OIL PO) Take 1,000 mg by mouth two (2) times a day. Yes Provider, Historical   ranitidine (ZANTAC) 300 mg tablet TAKE 1 TABLET BY MOUTH EVERY DAY 7/25/16   Mi Sanchez MD     Allergies   Allergen Reactions    Ampicillin (Bulk) Rash    Astelin [Azelastine] Other (comments)     NASAL IRRITATION    Lipitor [Atorvastatin] Other (comments)     Myalgias      Pravachol [Pravastatin] Myalgia    Welchol [Colesevelam] Myalgia    Zetia [Ezetimibe] Other (comments)     Myalgias      Zocor [Simvastatin] Nausea Only           Review of Systems   Constitutional: Negative. HENT: Negative. Eyes: Negative. Respiratory: Negative. Cardiovascular: Negative. Gastrointestinal: Negative. Genitourinary: Negative. Musculoskeletal: Negative. Skin: Positive for rash. Neurological: Negative. Psychiatric/Behavioral: Negative. Objective:   Vital Signs: (As obtained by patient/caregiver at home)  There were no vitals taken for this visit.      [INSTRUCTIONS:  \"[x]\" Indicates a positive item  \"[]\" Indicates a negative item  -- DELETE ALL ITEMS NOT EXAMINED]    Constitutional: [x] Appears well-developed and well-nourished [x] No apparent distress      [] Abnormal -     Mental status: [x] Alert and awake  [x] Oriented to person/place/time [x] Able to follow commands    [] Abnormal -     Eyes:   EOM    [x]  Normal    [] Abnormal -   Sclera  [x]  Normal    [] Abnormal -          Discharge [x]  None visible   [] Abnormal -     HENT: [x] Normocephalic, atraumatic  [] Abnormal -   [x] Mouth/Throat: Mucous membranes are moist    External Ears [x] Normal  [] Abnormal -    Neck: [x] No visualized mass [] Abnormal -     Pulmonary/Chest: [x] Respiratory effort normal   [x] No visualized signs of difficulty breathing or respiratory distress        [] Abnormal -      Musculoskeletal:   [x] Normal gait with no signs of ataxia         [x] Normal range of motion of neck        [] Abnormal -     Neurological:        [x] No Facial Asymmetry (Cranial nerve 7 motor function) (limited exam due to video visit)          [x] No gaze palsy        [] Abnormal -          Skin:        [] No significant exanthematous lesions or discoloration noted on facial skin         [x] Abnormal -A few papular erythematous pinpoint blisterlike lesions diffusely spread over bilateral forearms           Psychiatric:       [x] Normal Affect [] Abnormal -        [x] No Hallucinations    Other pertinent observable physical exam findings:-        We discussed the expected course, resolution and complications of the diagnosis(es) in detail. Medication risks, benefits, costs, interactions, and alternatives were discussed as indicated. I advised her to contact the office if her condition worsens, changes or fails to improve as anticipated. She expressed understanding with the diagnosis(es) and plan. Roxie Rodriguez is a 65 y.o. female who was evaluated by a video visit encounter for concerns as above.  Patient identification was verified prior to start of the visit. A caregiver was present when appropriate. Due to this being a TeleHealth encounter (During XWBOJ-02 public health emergency), evaluation of the following organ systems was limited: Vitals/Constitutional/EENT/Resp/CV/GI//MS/Neuro/Skin/Heme-Lymph-Imm. Pursuant to the emergency declaration under the [facility], [facility] waiver authority and the [employer] and [employer] Act, this Virtual  Visit was conducted, with patient's (and/or legal guardian's) consent, to reduce the patient's risk of exposure to COVID-19 and provide necessary medical care. Services were provided through a video synchronous discussion virtually to substitute for in-person clinic visit. Patient and provider were located at their individual homes.       Joce Lomax MD

## 2020-06-23 ENCOUNTER — TELEPHONE (OUTPATIENT)
Dept: INTERNAL MEDICINE CLINIC | Age: 70
End: 2020-06-23

## 2020-06-23 NOTE — TELEPHONE ENCOUNTER
----- Message from Cara Reece sent at 6/22/2020  4:37 PM EDT -----  Regarding: Dr. Srinivasa Thomas Message/Vendor Calls    Caller's first and last name: Reynold Libman Minor      Reason for call: Pt would like a call back with recommended podiatrists in the [facility] area.       Callback required yes/no and why: Yes      Best contact number(s): [phone]      Details to clarify the request:      Cara Reece

## 2020-10-07 ENCOUNTER — TELEPHONE (OUTPATIENT)
Dept: INTERNAL MEDICINE CLINIC | Age: 70
End: 2020-10-07

## 2020-10-07 NOTE — TELEPHONE ENCOUNTER
[facility]  [facility]               Caller's first and last name and relationship to patient (if not the patient): self   Best contact number: [phone]; [phone]   Preferred date and time: First Available In Person Appt, sooner than November if possible.    Scheduled appointment date and time: n/a   Reason for appointment: Pt has pain in R breast.   Details to clarify the request: n/a     Copy/Paste  ENVERA

## 2020-10-13 NOTE — TELEPHONE ENCOUNTER
Identified patient 2 identifiers verified. Patient reported that breast pain has stopped. Declined to schedule follow up appointment at this time.

## 2021-03-08 ENCOUNTER — TELEPHONE (OUTPATIENT)
Dept: INTERNAL MEDICINE CLINIC | Age: 71
End: 2021-03-08

## 2021-03-08 ENCOUNTER — VIRTUAL VISIT (OUTPATIENT)
Dept: INTERNAL MEDICINE CLINIC | Age: 71
End: 2021-03-08
Payer: MEDICARE

## 2021-03-08 DIAGNOSIS — L30.9 DERMATITIS: ICD-10-CM

## 2021-03-08 DIAGNOSIS — Z00.00 MEDICARE ANNUAL WELLNESS VISIT, SUBSEQUENT: Primary | ICD-10-CM

## 2021-03-08 DIAGNOSIS — E11.9 CONTROLLED TYPE 2 DIABETES MELLITUS WITHOUT COMPLICATION, WITHOUT LONG-TERM CURRENT USE OF INSULIN (HCC): ICD-10-CM

## 2021-03-08 DIAGNOSIS — Z13.6 SCREENING FOR ISCHEMIC HEART DISEASE: ICD-10-CM

## 2021-03-08 DIAGNOSIS — I10 ESSENTIAL HYPERTENSION, BENIGN: ICD-10-CM

## 2021-03-08 DIAGNOSIS — Z12.31 ENCOUNTER FOR SCREENING MAMMOGRAM FOR MALIGNANT NEOPLASM OF BREAST: ICD-10-CM

## 2021-03-08 PROCEDURE — G9231 DOC ESRD DIA TRANS PREG: HCPCS | Performed by: FAMILY MEDICINE

## 2021-03-08 PROCEDURE — G9899 SCRN MAM PERF RSLTS DOC: HCPCS | Performed by: FAMILY MEDICINE

## 2021-03-08 PROCEDURE — 3046F HEMOGLOBIN A1C LEVEL >9.0%: CPT | Performed by: FAMILY MEDICINE

## 2021-03-08 PROCEDURE — 99214 OFFICE O/P EST MOD 30 MIN: CPT | Performed by: FAMILY MEDICINE

## 2021-03-08 PROCEDURE — 2022F DILAT RTA XM EVC RTNOPTHY: CPT | Performed by: FAMILY MEDICINE

## 2021-03-08 PROCEDURE — G0439 PPPS, SUBSEQ VISIT: HCPCS | Performed by: FAMILY MEDICINE

## 2021-03-08 PROCEDURE — 3017F COLORECTAL CA SCREEN DOC REV: CPT | Performed by: FAMILY MEDICINE

## 2021-03-08 PROCEDURE — 1101F PT FALLS ASSESS-DOCD LE1/YR: CPT | Performed by: FAMILY MEDICINE

## 2021-03-08 PROCEDURE — G8536 NO DOC ELDER MAL SCRN: HCPCS | Performed by: FAMILY MEDICINE

## 2021-03-08 PROCEDURE — G8427 DOCREV CUR MEDS BY ELIG CLIN: HCPCS | Performed by: FAMILY MEDICINE

## 2021-03-08 PROCEDURE — G0463 HOSPITAL OUTPT CLINIC VISIT: HCPCS | Performed by: FAMILY MEDICINE

## 2021-03-08 PROCEDURE — G8421 BMI NOT CALCULATED: HCPCS | Performed by: FAMILY MEDICINE

## 2021-03-08 PROCEDURE — G8510 SCR DEP NEG, NO PLAN REQD: HCPCS | Performed by: FAMILY MEDICINE

## 2021-03-08 PROCEDURE — G8399 PT W/DXA RESULTS DOCUMENT: HCPCS | Performed by: FAMILY MEDICINE

## 2021-03-08 RX ORDER — TRIAMCINOLONE ACETONIDE 1 MG/G
CREAM TOPICAL 2 TIMES DAILY
Qty: 15 G | Refills: 0 | Status: SHIPPED | OUTPATIENT
Start: 2021-03-08 | End: 2021-10-04 | Stop reason: ALTCHOICE

## 2021-03-08 RX ORDER — LANCETS
EACH MISCELLANEOUS
Qty: 100 EACH | Refills: 11 | Status: SHIPPED | OUTPATIENT
Start: 2021-03-08 | End: 2021-11-12 | Stop reason: SDUPTHER

## 2021-03-08 NOTE — PROGRESS NOTES
Esa Rodriguez is a 66 y.o. female who was seen by synchronous (real-time) audio-video technology on 3/8/2021 for Complete Physical    Assessment & Plan:   Diagnoses and all orders for this visit:    1. Medicare annual wellness visit, subsequent  -     REFERRAL TO PODIATRY  -     LIPID PANEL    2. Essential hypertension, benign    3. Controlled type 2 diabetes mellitus without complication, without long-term current use of insulin (HCC)  -     glucose blood VI test strips (ASCENSIA AUTODISC VI, ONE TOUCH ULTRA TEST VI) strip; One touch test strips to test blood  Glucose twice daily E11.9  -     lancets misc; Check blood glucose daily.  -     REFERRAL TO PODIATRY    4. Screening for ischemic heart disease  -     LIPID PANEL    5. Encounter for screening mammogram for malignant neoplasm of breast  -     [facility] MAMMO BI SCREENING INCL CAD; Future    6. Dermatitis  -     triamcinolone acetonide (KENALOG) 0.1 % topical cream; Apply  to affected area two (2) times a day. use thin layer    1. Medicare Annual Wellness Visit  See attached note. I referred her to Dr. Eduardo Dillard (podiatry). I advised her to call Dr. Oc Maciel (gastroenterology). Recheck lipid panel. 2. Essential Hypertension  I recommended continuing current dose of  Benicar HCT 20-12.5 mg tablet daily, eating a low sodium diet, and increasing exercise. I advised her to take the Tricor and BP medication separately. 3. Controlled Type 2 Diabetes Mellitus without Complication  I ordered more diabetic supplies. I referred her to Dr. Eduardo Dillard (podiatry). 4.Screening for Ischemic Heart Disease  Recheck lipid panel. 5. Encounter for Screening Mammogram for Malignant Neoplasm of Breast  I ordered a mammogram for routine screening. 6. Dermatitis  I prescribed Kenalog 0.1% topical cream to use as directed. I advised her to mix polysporin and the Kenalog cream and apply to the breakouts and rash on her back.     Subjective:     Patient presents virtually today for an annual wellness visit. Annual Wellness Visit: She expressed interest in finding another podiatrist. She denies FHx of osteopenia or bone thinning or changes in memory. HTN: Pt is compliant in taking Benicar HCT 20-12.5 mg tablet daily. Patient denies chest pain, FIGUEROA/SOB, edema, headache, visual changes, dizziness, palpitations or syncope. DM Type 2: She notes her blood glucose ranges from 116-130s. Last HgA1c was 6.7% in 03/2020. Dermatitis: She reports breakouts, a rash, and feeling ill after taking her medication and believes it might be d/t the Benicar. She admits she will occasionally scratch the rash if it starts to itch. She has tried hydrocortisone with no relief. PREVENTIVE:  Colonoscopy: UTD. Mammogram: Ordered. Dexa: 2017 (normal). Eye Exam: UTD. Flu: UTD  COVID-19 vaccine: 1 of 2 completed. Prior to Admission medications    Medication Sig Start Date End Date Taking? Authorizing Provider   glucose blood VI test strips (ASCENSIA AUTODISC VI, ONE TOUCH ULTRA TEST VI) strip One touch test strips to test blood  Glucose twice daily E11.9 3/8/21  Yes Mane Beltre MD   lancets misc Check blood glucose daily. 3/8/21  Yes Mane Beltre MD   triamcinolone acetonide (KENALOG) 0.1 % topical cream Apply  to affected area two (2) times a day. use thin layer 3/8/21  Yes Mane Beltre MD   fenofibrate nanocrystallized (TRICOR) 48 mg tablet TAKE 1 TABLET BY MOUTH EVERY DAY 6/1/20  Yes Mane Beltre MD   olmesartan-hydroCHLOROthiazide [facility] HCT) 20-12.5 mg per tablet TAKE 1 TABLET BY MOUTH DAILY 5/4/20  Yes Mane Beltre MD   [facility] ULTRA TEST strip USE TO TEST BLOOD SUGAR TWICE DAILY 11/6/17  Yes Aaron Cho MD   fluticasone (FLONASE) 50 mcg/actuation nasal spray 2 Sprays by Both Nostrils route daily. 8/2/17  Yes Jackson Yeager MD   ascorbic acid (VITAMIN C) 500 mg tablet Take  by mouth.    Yes Provider, Historical   glucose blood VI test strips (BLOOD GLUCOSE TEST) strip Check Blood Sugar 6-7 times a week 1/15/15  Yes Sofy Batista MD   cholecalciferol, vitamin D3, (VITAMIN D-3) 2,000 unit Tab Take 2,000 Units by mouth daily. 2/16/11  Yes Provider, Historical   PYRIDOXINE HCL (VITAMIN B-6 PO) Take  by mouth daily. 2/3/11  Yes Provider, Historical   aspirin delayed-release 325 mg tablet Take 325 mg by mouth daily. 3/5/10  Yes Provider, Historical   MULTIVITAMINS (MULTIVITAMIN PO) Take  by mouth daily. Centrum silver   Yes Provider, Historical   DOCOSAHEXANOIC ACID/EPA (FISH OIL PO) Take 1,000 mg by mouth two (2) times a day. Yes Provider, Historical   glucose blood VI test strips (ASCENSIA AUTODISC VI, ONE TOUCH ULTRA TEST VI) strip One touch test strips to test blood  Glucose twice daily E11.9 6/26/19 3/8/21  Sofy Batista MD   ranitidine (ZANTAC) 300 mg tablet TAKE 1 TABLET BY MOUTH EVERY DAY 7/25/16   Sofy Batista MD     Patient Active Problem List    Diagnosis Date Noted    Severe obesity (BMI 35.0-39.9) 06/20/2018    Obesity 02/13/2013    Vitamin D deficiency 02/03/2011    Seasonal allergic rhinitis 04/02/2010    Anxiety 03/05/2010    Neck pain 03/05/2010    Type II or unspecified type diabetes mellitus without mention of complication, not stated as uncontrolled 11/06/2009    Essential hypertension, benign 11/06/2009    Dyslipidemia 11/06/2009    Low back pain 11/06/2009    DJD (degenerative joint disease), multiple sites 11/06/2009    GERD (gastroesophageal reflux disease) 11/06/2009     Current Outpatient Medications   Medication Sig Dispense Refill    glucose blood VI test strips (ASCENSIA AUTODISC VI, ONE TOUCH ULTRA TEST VI) strip One touch test strips to test blood  Glucose twice daily E11.9 100 Strip 3    lancets misc Check blood glucose daily. 100 Each 11    triamcinolone acetonide (KENALOG) 0.1 % topical cream Apply  to affected area two (2) times a day.  use thin layer 15 g 0    fenofibrate nanocrystallized (TRICOR) 48 mg tablet TAKE 1 TABLET BY MOUTH EVERY DAY 90 Tab 1    olmesartan-hydroCHLOROthiazide (BENICAR HCT) 20-12.5 mg per tablet TAKE 1 TABLET BY MOUTH DAILY 30 Tab 5    ONETOUCH ULTRA TEST strip USE TO TEST BLOOD SUGAR TWICE DAILY 100 Strip 0    fluticasone (FLONASE) 50 mcg/actuation nasal spray 2 Sprays by Both Nostrils route daily. 1 Bottle 3    ascorbic acid (VITAMIN C) 500 mg tablet Take  by mouth.  glucose blood VI test strips (BLOOD GLUCOSE TEST) strip Check Blood Sugar 6-7 times a week 100 strip 11    cholecalciferol, vitamin D3, (VITAMIN D-3) 2,000 unit Tab Take 2,000 Units by mouth daily.  PYRIDOXINE HCL (VITAMIN B-6 PO) Take  by mouth daily.  aspirin delayed-release 325 mg tablet Take 325 mg by mouth daily.  MULTIVITAMINS (MULTIVITAMIN PO) Take  by mouth daily. Centrum silver      DOCOSAHEXANOIC ACID/EPA (FISH OIL PO) Take 1,000 mg by mouth two (2) times a day.       ranitidine (ZANTAC) 300 mg tablet TAKE 1 TABLET BY MOUTH EVERY DAY 60 Tab 6     Allergies   Allergen Reactions    Ampicillin (Bulk) Rash    Astelin [Azelastine] Other (comments)     NASAL IRRITATION    Lipitor [Atorvastatin] Other (comments)     Myalgias      Pravachol [Pravastatin] Myalgia    Welchol [Colesevelam] Myalgia    Zetia [Ezetimibe] Other (comments)     Myalgias      Zocor [Simvastatin] Nausea Only     Past Medical History:   Diagnosis Date    Diabetes ([address]     DJD (degenerative joint disease)     NORDT    GERD (gastroesophageal reflux disease)     Hyperlipidemia     Hypertension     Obesity     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 Burnett [facility] [place]. MAMMO DIAG BILAT  04/21/08    negative; f/u 1 yr    HX BREAST BIOPSY Left 1988 benign    HX CARPAL TUNNEL RELEASE  12/19/2017    HX CARPAL TUNNEL RELEASE Right 12/2019    Dr. Ricki Stokes HX KNEE ARTHROSCOPY Right 1999    HX ORTHOPAEDIC  01/04    trigger finger    Jewels Arce  02/2004    Dr. Luis Stevens     Family History   Problem Relation Age of Onset    Hypertension Mother     Stroke Mother     Diabetes Mother     Heart Disease Mother     Hypertension Father     Heart Disease Father     Heart Attack Father     Heart Disease Sister     Alcohol abuse Brother     Tuberculosis Brother     Diabetes Sister     Kidney Disease Sister     Kidney Disease Sister     Diabetes Brother      Social History     Tobacco Use    Smoking status: Never Smoker    Smokeless tobacco: Never Used   Substance Use Topics    Alcohol use: Yes     Alcohol/week: 0.0 standard drinks     Comment: occasionally       Review of Systems   Respiratory: Negative for shortness of breath. Cardiovascular: Negative for chest pain. Skin: Positive for rash (back).        Objective:     Patient-Reported Vitals 3/8/2021   Patient-Reported Pulse 74   Patient-Reported Systolic  478   Patient-Reported Diastolic 70        [INSTRUCTIONS:  \"[x]\" Indicates a positive item  \"[]\" Indicates a negative item  -- DELETE ALL ITEMS NOT EXAMINED]    Constitutional: [x] Appears well-developed and well-nourished [x] No apparent distress      [] Abnormal -     Mental status: [x] Alert and awake  [x] Oriented to person/place/time [x] Able to follow commands    [] Abnormal -     Eyes:   EOM    [x]  Normal    [] Abnormal -   Sclera  [x]  Normal    [] Abnormal -          Discharge [x]  None visible   [] Abnormal -     HENT: [x] Normocephalic, atraumatic  [] Abnormal -   [x] Mouth/Throat: Mucous membranes are moist    External Ears [x] Normal  [] Abnormal -    Neck: [x] No visualized mass [] Abnormal - Pulmonary/Chest: [x] Respiratory effort normal   [x] No visualized signs of difficulty breathing or respiratory distress        [] Abnormal -      Musculoskeletal:   [x] Normal gait with no signs of ataxia         [x] Normal range of motion of neck        [] Abnormal -     Neurological:        [x] No Facial Asymmetry (Cranial nerve 7 motor function) (limited exam due to video visit)          [x] No gaze palsy        [] Abnormal -          Skin:        [x] No significant exanthematous lesions or discoloration noted on facial skin         [] Abnormal -            Psychiatric:       [x] Normal Affect [] Abnormal -        [x] No Hallucinations    Other pertinent observable physical exam findings:-        We discussed the expected course, resolution and complications of the diagnosis(es) in detail. Medication risks, benefits, costs, interactions, and alternatives were discussed as indicated. I advised her to contact the office if her condition worsens, changes or fails to improve as anticipated. She expressed understanding with the diagnosis(es) and plan. Jaylyn Bras A Minor, was evaluated through a synchronous (real-time) audio-video encounter. The patient (or guardian if applicable) is aware that this is a billable service. Verbal consent to proceed has been obtained within the past 12 months. The visit was conducted pursuant to the emergency declaration under the [address] [place] [address] authority and the [employer] Act. Patient identification was verified, and a caregiver was present when appropriate. The patient was located in a state where the provider was credentialed to provide care.       Gina Rush, as dictated by Elfego Azar MD.

## 2021-03-08 NOTE — PROGRESS NOTES
This is the Subsequent Medicare Annual Wellness Exam, performed 12 months or more after the Initial AWV or the last Subsequent AWV    I have reviewed the patient's medical history in detail and updated the computerized patient record. Depression Risk Factor Screening:     3 most recent PHQ Screens 3/8/2021   Little interest or pleasure in doing things Not at all   Feeling down, depressed, irritable, or hopeless Not at all   Total Score PHQ 2 0       Alcohol Risk Screen    Do you average more than 1 drink per night or more than 7 drinks a week:  No    On any one occasion in the past three months have you have had more than 3 drinks containing alcohol:  No        Functional Ability and Level of Safety:    Hearing: Hearing is good. Activities of Daily Living: The home contains: no safety equipment. Patient does total self care      Ambulation: with no difficulty     Fall Risk:  Fall Risk Assessment, last 12 mths 3/8/2021   Able to walk? Yes   Fall in past 12 months? 0   Do you feel unsteady? 0   Are you worried about falling 0   Number of falls in past 12 months -   Fall with injury? -      Abuse Screen:  Patient is not abused       Cognitive Screening    Has your family/caregiver stated any concerns about your memory: no         Assessment/Plan   Education and counseling provided:  Are appropriate based on today's review and evaluation  End-of-Life planning (with patient's consent)  Screening Mammography  Colorectal cancer screening tests  Cardiovascular screening blood test  Screening for glaucoma    Diagnoses and all orders for this visit:    1. Medicare annual wellness visit, subsequent  -     REFERRAL TO PODIATRY    2. Essential hypertension, benign    3. Elevated hemoglobin A1c    4. Controlled type 2 diabetes mellitus without complication, without long-term current use of insulin (HCC)  -     glucose blood VI test strips (ASCENSIA AUTODISC VI, ONE TOUCH ULTRA TEST VI) strip;  One touch test strips to test blood  Glucose twice daily E11.9  -     lancets misc; Check blood glucose daily.  -     REFERRAL TO PODIATRY    5. Screening for ischemic heart disease    6. Encounter for screening mammogram for malignant neoplasm of breast  -     [facility] MAMMO BI SCREENING INCL CAD; Future        Health Maintenance Due     Health Maintenance Due   Topic Date Due    COVID-19 Vaccine (1 of 2) Never done    DTaP/Tdap/Td series (1 - Tdap) Never done    Shingrix Vaccine Age 50> (1 of 2) Never done    Foot Exam Q1  01/15/2016    GLAUCOMA SCREENING Q2Y  11/20/2016    Eye Exam Retinal or Dilated  11/20/2016    Flu Vaccine (1) Never done    A1C test (Diabetic or Prediabetic)  03/06/2021    MICROALBUMIN Q1  03/06/2021    Lipid Screen  03/06/2021       Patient Care Team   Patient Care Team:  Agustin Estrada MD as PCP - General (Family Medicine)  Agustin Estrada MD as PCP - [facility] Empaneled Provider  Suzanne Hutson MD (Cardiology)  Opal Quinonez MD (Gastroenterology)  Yinka Xiong MD (Optometry)    History     Patient Active Problem List   Diagnosis Code    Type II or unspecified type diabetes mellitus without mention of complication, not stated as uncontrolled E11.9    Essential hypertension, benign I10    Dyslipidemia E78.5    Low back pain M54.5    DJD (degenerative joint disease), multiple sites M15.9    GERD (gastroesophageal reflux disease) K21.9    Anxiety F41.9    Neck pain M54.2    Seasonal allergic rhinitis J30.2    Vitamin D deficiency E55.9    Obesity E66.9    Severe obesity (BMI 35.0-39. 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 Ron Lux  04/21/08    negative; f/u 1 yr    HX BREAST BIOPSY Left 1988    benign    HX CARPAL TUNNEL RELEASE  12/19/2017    HX CARPAL TUNNEL RELEASE Right 12/2019    Dr. Araceli Bravo ARTHROSCOPY Right 1999    HX ORTHOPAEDIC  01/04    trigger finger    HX SHOULDER ARTHROSCOPY      HX Linda Hodgkin    Dr. Paul Bowden CUFF,CHRONIC  02/2004    Dr. Dottie Meng     Current Outpatient Medications   Medication Sig Dispense Refill    glucose blood VI test strips (ASCENSIA AUTODISC VI, ONE TOUCH ULTRA TEST VI) strip One touch test strips to test blood  Glucose twice daily E11.9 100 Strip 3    lancets misc Check blood glucose daily. 100 Each 11    fenofibrate nanocrystallized (TRICOR) 48 mg tablet TAKE 1 TABLET BY MOUTH EVERY DAY 90 Tab 1    olmesartan-hydroCHLOROthiazide (BENICAR HCT) 20-12.5 mg per tablet TAKE 1 TABLET BY MOUTH DAILY 30 Tab 5    ONETOUCH ULTRA TEST strip USE TO TEST BLOOD SUGAR TWICE DAILY 100 Strip 0    fluticasone (FLONASE) 50 mcg/actuation nasal spray 2 Sprays by Both Nostrils route daily. 1 Bottle 3    ascorbic acid (VITAMIN C) 500 mg tablet Take  by mouth.  glucose blood VI test strips (BLOOD GLUCOSE TEST) strip Check Blood Sugar 6-7 times a week 100 strip 11    cholecalciferol, vitamin D3, (VITAMIN D-3) 2,000 unit Tab Take 2,000 Units by mouth daily.  PYRIDOXINE HCL (VITAMIN B-6 PO) Take  by mouth daily.  aspirin delayed-release 325 mg tablet Take 325 mg by mouth daily.  MULTIVITAMINS (MULTIVITAMIN PO) Take  by mouth daily. Centrum silver      DOCOSAHEXANOIC ACID/EPA (FISH OIL PO) Take 1,000 mg by mouth two (2) times a day.       ranitidine (ZANTAC) 300 mg tablet TAKE 1 TABLET BY MOUTH EVERY DAY 60 Tab 6     Allergies Allergen Reactions    Ampicillin (Bulk) Rash    Astelin [Azelastine] Other (comments)     NASAL IRRITATION    Lipitor [Atorvastatin] Other (comments)     Myalgias      Pravachol [Pravastatin] Myalgia    Welchol [Colesevelam] Myalgia    Zetia [Ezetimibe] Other (comments)     Myalgias      Zocor [Simvastatin] Nausea Only       Family History   Problem Relation Age of Onset   Gloriajean Seeds Hypertension Mother     Stroke Mother     Diabetes Mother     Heart Disease Mother     Hypertension Father     Heart Disease Father     Heart Attack Father     Heart Disease Sister     Alcohol abuse Brother     Tuberculosis Brother     Diabetes Sister     Kidney Disease Sister     Kidney Disease Sister     Diabetes Brother      Social History     Tobacco Use    Smoking status: Never Smoker    Smokeless tobacco: Never Used   Substance Use Topics    Alcohol use:  Yes     Alcohol/week: 0.0 standard drinks     Comment: occasionally

## 2021-03-08 NOTE — TELEPHONE ENCOUNTER
Patient states she needs a call back in reference to her appt earlier with Dr. Claudetta Lenz & being advised to schedule her Colonoscopy. Patient states she called to schedule appt & was advised they have the patient on a 10 yr schedule not a 5 year schedule. Please call to discuss.  Thank you

## 2021-03-08 NOTE — PATIENT INSTRUCTIONS
Medicare Wellness Visit, Female The best way to live healthy is to have a lifestyle where you eat a well-balanced diet, exercise regularly, limit alcohol use, and quit all forms of tobacco/nicotine, if applicable. Regular preventive services are another way to keep healthy. Preventive services (vaccines, screening tests, monitoring & exams) can help personalize your care plan, which helps you manage your own care. Screening tests can find health problems at the earliest stages, when they are easiest to treat. Lisa follows the current, evidence-based guidelines published by the [facility] Klein ([facility]) when recommending preventive services for our patients. Because we follow these guidelines, sometimes recommendations change over time as research supports it. (For example, mammograms used to be recommended annually. Even though Medicare will still pay for an annual mammogram, the newer guidelines recommend a mammogram every two years for women of average risk). Of course, you and your doctor may decide to screen more often for some diseases, based on your risk and your co-morbidities (chronic disease you are already diagnosed with). Preventive services for you include: - Medicare offers their members a free annual wellness visit, which is time for you and your primary care provider to discuss and plan for your preventive service needs. Take advantage of this benefit every year! 
-All adults over the age of 72 should receive the recommended pneumonia vaccines. Current USPSTF guidelines recommend a series of two vaccines for the best pneumonia protection.  
-All adults should have a flu vaccine yearly and a tetanus vaccine every 10 years.  
-All adults age 48 and older should receive the shingles vaccines (series of two vaccines).      
-All adults age 38-68 who are overweight should have a diabetes screening test once every three years.  
-All adults born between [birth year] and 1967 should be screened once for Hepatitis C. 
-Other screening tests and preventive services for persons with diabetes include: an eye exam to screen for diabetic retinopathy, a kidney function test, a foot exam, and stricter control over your cholesterol.  
-Cardiovascular screening for adults with routine risk involves an electrocardiogram (ECG) at intervals determined by your doctor.  
-Colorectal cancer screenings should be done for adults age 54-65 with no increased risk factors for colorectal cancer. There are a number of acceptable methods of screening for this type of cancer. Each test has its own benefits and drawbacks. Discuss with your doctor what is most appropriate for you during your annual wellness visit. The different tests include: colonoscopy (considered the best screening method), a fecal occult blood test, a fecal DNA test, and sigmoidoscopy. 
 
-A bone mass density test is recommended when a woman turns 65 to screen for osteoporosis. This test is only recommended one time, as a screening. Some providers will use this same test as a disease monitoring tool if you already have osteoporosis. -Breast cancer screenings are recommended every other year for women of normal risk, age 54-69. 
-Cervical cancer screenings for women over age 72 are only recommended with certain risk factors. Here is a list of your current Health Maintenance items (your personalized list of preventive services) with a due date: 
Health Maintenance Due Topic Date Due  
 COVID-19 Vaccine (1 of 2) Never done  DTaP/Tdap/Td  (1 - Tdap) Never done  Shingles Vaccine (1 of 2) Never done [facility] Diabetic Foot [facility]  01/15/2016  Glaucoma Screening   11/20/2016  Eye Exam  11/20/2016  Yearly Flu Vaccine (1) Never done  Hemoglobin A1C    03/06/2021  Albumin Urine Test  03/06/2021  Cholesterol Test   03/06/2021

## 2021-04-14 ENCOUNTER — HOSPITAL ENCOUNTER (OUTPATIENT)
Dept: LAB | Age: 71
Discharge: HOME | End: 2021-04-14
Payer: MEDICARE

## 2021-04-14 PROCEDURE — 80061 LIPID PANEL: CPT

## 2021-04-14 PROCEDURE — 36415 COLL VENOUS BLD VENIPUNCTURE: CPT

## 2021-04-15 LAB
CHOLEST SERPL-MCNC: 216 MG/DL (ref 100–199)
HDLC SERPL-MCNC: 63 MG/DL
LDLC SERPL CALC-MCNC: 142 MG/DL (ref 0–99)
TRIGL SERPL-MCNC: 65 MG/DL (ref 0–149)
VLDLC SERPL CALC-MCNC: 11 MG/DL (ref 5–40)

## 2021-06-23 NOTE — TELEPHONE ENCOUNTER
Pt is requesting that the Mammogram be change to [employer]. Pt received a call stating that \"[facility]\" cannot perform what the Dr wanted done.  Pts contact [phone] or [phone]       Message received & copied from [facility]

## 2021-06-30 ENCOUNTER — HOSPITAL ENCOUNTER (OUTPATIENT)
Dept: MAMMOGRAPHY | Age: 71
Discharge: HOME | End: 2021-06-30
Attending: FAMILY MEDICINE
Payer: MEDICARE

## 2021-06-30 DIAGNOSIS — Z12.31 ENCOUNTER FOR SCREENING MAMMOGRAM FOR MALIGNANT NEOPLASM OF BREAST: ICD-10-CM

## 2021-06-30 PROCEDURE — 77067 SCR MAMMO BI INCL CAD: CPT

## 2021-06-30 PROCEDURE — 77063 BREAST TOMOSYNTHESIS BI: CPT

## 2021-07-09 ENCOUNTER — VIRTUAL VISIT (OUTPATIENT)
Dept: INTERNAL MEDICINE CLINIC | Age: 71
End: 2021-07-09
Payer: MEDICARE

## 2021-07-09 DIAGNOSIS — R07.89 CHEST WALL PAIN: Primary | ICD-10-CM

## 2021-07-09 PROCEDURE — 3017F COLORECTAL CA SCREEN DOC REV: CPT | Performed by: FAMILY MEDICINE

## 2021-07-09 PROCEDURE — G8421 BMI NOT CALCULATED: HCPCS | Performed by: FAMILY MEDICINE

## 2021-07-09 PROCEDURE — G8510 SCR DEP NEG, NO PLAN REQD: HCPCS | Performed by: FAMILY MEDICINE

## 2021-07-09 PROCEDURE — G0463 HOSPITAL OUTPT CLINIC VISIT: HCPCS | Performed by: FAMILY MEDICINE

## 2021-07-09 PROCEDURE — 1090F PRES/ABSN URINE INCON ASSESS: CPT | Performed by: FAMILY MEDICINE

## 2021-07-09 PROCEDURE — G9899 SCRN MAM PERF RSLTS DOC: HCPCS | Performed by: FAMILY MEDICINE

## 2021-07-09 PROCEDURE — 1101F PT FALLS ASSESS-DOCD LE1/YR: CPT | Performed by: FAMILY MEDICINE

## 2021-07-09 PROCEDURE — 99213 OFFICE O/P EST LOW 20 MIN: CPT | Performed by: FAMILY MEDICINE

## 2021-07-09 PROCEDURE — G8427 DOCREV CUR MEDS BY ELIG CLIN: HCPCS | Performed by: FAMILY MEDICINE

## 2021-07-09 PROCEDURE — G8536 NO DOC ELDER MAL SCRN: HCPCS | Performed by: FAMILY MEDICINE

## 2021-07-09 PROCEDURE — G8399 PT W/DXA RESULTS DOCUMENT: HCPCS | Performed by: FAMILY MEDICINE

## 2021-07-09 PROCEDURE — G9231 DOC ESRD DIA TRANS PREG: HCPCS | Performed by: FAMILY MEDICINE
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
## 2021-07-19 ENCOUNTER — PATIENT MESSAGE (OUTPATIENT)
Dept: INTERNAL MEDICINE CLINIC | Age: 71
End: 2021-07-19

## 2021-08-04 ENCOUNTER — TELEPHONE (OUTPATIENT)
Dept: INTERNAL MEDICINE CLINIC | Age: 71
End: 2021-08-04

## 2021-08-04 DIAGNOSIS — I10 ESSENTIAL HYPERTENSION, BENIGN: ICD-10-CM

## 2021-08-04 DIAGNOSIS — E78.5 DYSLIPIDEMIA: ICD-10-CM

## 2021-08-04 DIAGNOSIS — E11.8 CONTROLLED TYPE 2 DIABETES MELLITUS WITH COMPLICATION, WITHOUT LONG-TERM CURRENT USE OF INSULIN (HCC): ICD-10-CM

## 2021-08-04 DIAGNOSIS — J30.2 SEASONAL ALLERGIC RHINITIS, UNSPECIFIED TRIGGER: Primary | ICD-10-CM

## 2021-08-04 NOTE — TELEPHONE ENCOUNTER
Called, LM for pt to call back to schedule appt. Only VV is the soonest and next available appointment is in September for in office. Waiting from call back from pt.

## 2021-08-04 NOTE — TELEPHONE ENCOUNTER
Patient states she needs a call back in reference to My Chart Message received to schedule her Overdue appt for her 3 mos F/U-A1C Check. No Appts Available for in office to have appt done at time of call until October. Please call to discuss & advise. Thank you      Patient states she also needs to know if Dr. Bernice Cannon or Nurse knows anything on [address]

## 2021-08-11 NOTE — TELEPHONE ENCOUNTER
Pt called again. Pt states received the message saying overdue for follow up and a1c check. Pt states having congestion for last week or two, and needs medication. Per suggestion from nurse evelyne, this psr offered pt soonest available practice VV visit for congestion symptoms,  8/19 with dr Sarai Palomo. Pt declined stating that VV is unable to help and she will go to urgent care. Pt still needs lab orders for A1C. Please call pt to advise on congestion symptoms and request for medication.     [phone]

## 2021-08-12 NOTE — TELEPHONE ENCOUNTER
Identified patient 2 identifiers verified. Patient accepted an appointment with Dr. Mariama Rivera on October 4,20201. Per verbal read back from Dr. Mariama Rivera orders for Hemoglobin A1c, Lipid panel, CBC, metabolic panel were ordered and printed and mailed to patient.

## 2021-08-23 ENCOUNTER — PATIENT MESSAGE (OUTPATIENT)
Dept: INTERNAL MEDICINE CLINIC | Age: 71
End: 2021-08-23

## 2021-08-24 NOTE — TELEPHONE ENCOUNTER
Identified patient 2 identifiers verified.   Spoke with patient  She did have COVID   No results yet

## 2021-09-16 LAB
ALBUMIN SERPL-MCNC: 4.8 G/DL (ref 3.7–4.7)
ALBUMIN/GLOB SERPL: 1.7 {RATIO} (ref 1.2–2.2)
ALP SERPL-CCNC: 111 IU/L (ref 44–121)
ALT SERPL-CCNC: 24 IU/L (ref 0–32)
AST SERPL-CCNC: 18 IU/L (ref 0–40)
BASOPHILS # BLD AUTO: 0 X10E3/UL (ref 0–0.2)
BASOPHILS NFR BLD AUTO: 1 %
BILIRUB SERPL-MCNC: 0.5 MG/DL (ref 0–1.2)
BUN SERPL-MCNC: 14 MG/DL (ref 8–27)
BUN/CREAT SERPL: 21 (ref 12–28)
CALCIUM SERPL-MCNC: 9.5 MG/DL (ref 8.7–10.3)
CHLORIDE SERPL-SCNC: 101 MMOL/L (ref 96–106)
CHOLEST SERPL-MCNC: 261 MG/DL (ref 100–199)
CO2 SERPL-SCNC: 23 MMOL/L (ref 20–29)
CREAT SERPL-MCNC: 0.68 MG/DL (ref 0.57–1)
EOSINOPHIL # BLD AUTO: 0.1 X10E3/UL (ref 0–0.4)
EOSINOPHIL NFR BLD AUTO: 2 %
ERYTHROCYTE [DISTWIDTH] IN BLOOD BY AUTOMATED COUNT: 14.5 % (ref 11.7–15.4)
EST. AVERAGE GLUCOSE BLD GHB EST-MCNC: 151 MG/DL
GLOBULIN SER CALC-MCNC: 2.8 G/DL (ref 1.5–4.5)
GLUCOSE SERPL-MCNC: 116 MG/DL (ref 65–99)
HBA1C MFR BLD: 6.9 % (ref 4.8–5.6)
HCT VFR BLD AUTO: 37.5 % (ref 34–46.6)
HDLC SERPL-MCNC: 65 MG/DL
HGB BLD-MCNC: 12.2 G/DL (ref 11.1–15.9)
IMM GRANULOCYTES # BLD AUTO: 0 X10E3/UL (ref 0–0.1)
IMM GRANULOCYTES NFR BLD AUTO: 0 %
LDLC SERPL CALC-MCNC: 183 MG/DL (ref 0–99)
LYMPHOCYTES # BLD AUTO: 2.3 X10E3/UL (ref 0.7–3.1)
LYMPHOCYTES NFR BLD AUTO: 43 %
MCH RBC QN AUTO: 27.5 PG (ref 26.6–33)
MCHC RBC AUTO-ENTMCNC: 32.5 G/DL (ref 31.5–35.7)
MCV RBC AUTO: 85 FL (ref 79–97)
MONOCYTES # BLD AUTO: 0.6 X10E3/UL (ref 0.1–0.9)
MONOCYTES NFR BLD AUTO: 11 %
NEUTROPHILS # BLD AUTO: 2.3 X10E3/UL (ref 1.4–7)
NEUTROPHILS NFR BLD AUTO: 43 %
PLATELET # BLD AUTO: 248 X10E3/UL (ref 150–450)
POTASSIUM SERPL-SCNC: 4.5 MMOL/L (ref 3.5–5.2)
PROT SERPL-MCNC: 7.6 G/DL (ref 6–8.5)
RBC # BLD AUTO: 4.43 X10E6/UL (ref 3.77–5.28)
SODIUM SERPL-SCNC: 138 MMOL/L (ref 134–144)
TRIGL SERPL-MCNC: 77 MG/DL (ref 0–149)
VLDLC SERPL CALC-MCNC: 13 MG/DL (ref 5–40)
WBC # BLD AUTO: 5.3 X10E3/UL (ref 3.4–10.8)

## 2021-10-04 ENCOUNTER — OFFICE VISIT (OUTPATIENT)
Dept: INTERNAL MEDICINE CLINIC | Age: 71
End: 2021-10-04
Payer: MEDICARE

## 2021-10-04 VITALS
OXYGEN SATURATION: 97 % | BODY MASS INDEX: 39.23 KG/M2 | HEIGHT: 63 IN | RESPIRATION RATE: 18 BRPM | DIASTOLIC BLOOD PRESSURE: 74 MMHG | WEIGHT: 221.4 LBS | TEMPERATURE: 97.8 F | SYSTOLIC BLOOD PRESSURE: 158 MMHG

## 2021-10-04 DIAGNOSIS — E78.5 DYSLIPIDEMIA: ICD-10-CM

## 2021-10-04 DIAGNOSIS — E66.01 SEVERE OBESITY (BMI 35.0-35.9 WITH COMORBIDITY) (HCC): ICD-10-CM

## 2021-10-04 DIAGNOSIS — I10 ESSENTIAL HYPERTENSION, BENIGN: Primary | ICD-10-CM

## 2021-10-04 PROCEDURE — 1090F PRES/ABSN URINE INCON ASSESS: CPT | Performed by: FAMILY MEDICINE

## 2021-10-04 PROCEDURE — G8536 NO DOC ELDER MAL SCRN: HCPCS | Performed by: FAMILY MEDICINE

## 2021-10-04 PROCEDURE — G8427 DOCREV CUR MEDS BY ELIG CLIN: HCPCS | Performed by: FAMILY MEDICINE

## 2021-10-04 PROCEDURE — 3017F COLORECTAL CA SCREEN DOC REV: CPT | Performed by: FAMILY MEDICINE

## 2021-10-04 PROCEDURE — G9899 SCRN MAM PERF RSLTS DOC: HCPCS | Performed by: FAMILY MEDICINE

## 2021-10-04 PROCEDURE — G8399 PT W/DXA RESULTS DOCUMENT: HCPCS | Performed by: FAMILY MEDICINE

## 2021-10-04 PROCEDURE — G8510 SCR DEP NEG, NO PLAN REQD: HCPCS | Performed by: FAMILY MEDICINE

## 2021-10-04 PROCEDURE — 99214 OFFICE O/P EST MOD 30 MIN: CPT | Performed by: FAMILY MEDICINE

## 2021-10-04 PROCEDURE — 1101F PT FALLS ASSESS-DOCD LE1/YR: CPT | Performed by: FAMILY MEDICINE

## 2021-10-04 PROCEDURE — G0463 HOSPITAL OUTPT CLINIC VISIT: HCPCS | Performed by: FAMILY MEDICINE

## 2021-10-04 PROCEDURE — G8417 CALC BMI ABV UP PARAM F/U: HCPCS | Performed by: FAMILY MEDICINE

## 2021-10-04 PROCEDURE — G9231 DOC ESRD DIA TRANS PREG: HCPCS | Performed by: FAMILY MEDICINE

## 2021-10-04 RX ORDER — DOXYCYCLINE 100 MG/1
CAPSULE ORAL
COMMUNITY
Start: 2021-08-22 | End: 2022-06-10 | Stop reason: ALTCHOICE

## 2021-10-04 NOTE — PROGRESS NOTES
Charmayne Balint Minor is a 66 y.o. female  HIPAA verified by two patient identifiers. Health Maintenance Due   Topic    DTaP/Tdap/Td series (1 - Tdap)    Shingrix Vaccine Age 50> (1 of 2)    Foot Exam Q1     Eye Exam Retinal or Dilated     MICROALBUMIN Q1     Flu Vaccine (1)     No chief complaint on file. Visit Vitals  BP (!) 158/74   Temp 97.8 °F (36.6 °C) (Temporal)   Resp 18   Ht 5' 3\" (1.6 m)   Wt 221 lb 6.4 oz (100.4 kg)   SpO2 97%   BMI 39.22 kg/m²       Pain Scale: 0 - No pain/10  Pain Location:   1. Have you been to the ER, urgent care clinic since your last visit? Hospitalized since your last visit? No    2. Have you seen or consulted any other health care providers outside of the [address] [place] [address] since your last visit? Include any pap smears or colon screening.  No

## 2021-10-04 NOTE — PROGRESS NOTES
SUBJECTIVE:   Ms. Jose Alejandro Ledezma is a 66 y.o. female who is here for follow up of routine medical issues. HTN: Pt's BP in office 170/78. She endorses taking her medication daily. Pt notes at home her BP runs 120's/65-80. She inquires about the use of honey because she uses it in her Tea and Coffee. Pt indicates she does not eat any fried foods. She notes trying to have more baked chicken. Pt specifically denies any problems urinating or changes in bowel habits. PREVENTIVE:  Colonoscopy: Due 08/23/2026   Mammogram: Completed 06/30/2021   Dexa: Completed 07/11/2017   Pneumonia vaccine: Completed PCV-13 on 06/14/2017   Flu: Not interested    COVID Booster: Interested - Due 11/2021     At this time, she is otherwise doing well and has brought no other complaints to my attention today. For a list of the medical issues addressed today, see the assessment and plan below. 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 All: is allergic to ampicillin (bulk), astelin [azelastine], lipitor [atorvastatin], pravachol [pravastatin], welchol [colesevelam], zetia [ezetimibe], and zocor [simvastatin].    MEDS:   Current Outpatient Medications   Medication Sig    doxycycline (VIBRAMYCIN) 100 mg capsule TAKE 1 CAPSULE BY MOUTH EVERY 12 HOURS    fenofibrate nanocrystallized (TRICOR) 48 mg tablet TAKE 1 TABLET BY MOUTH EVERY DAY    olmesartan-hydroCHLOROthiazide (BENICAR HCT) 20-12.5 mg per tablet TAKE 1 TABLET BY MOUTH DAILY    glucose blood VI test strips (ASCENSIA AUTODISC VI, ONE TOUCH ULTRA TEST VI) strip One touch test strips to test blood  Glucose twice daily E11.9    lancets misc Check blood glucose daily.  ONETOUCH ULTRA TEST strip USE TO TEST BLOOD SUGAR TWICE DAILY    fluticasone (FLONASE) 50 mcg/actuation nasal spray 2 Sprays by Both Nostrils route daily.  ascorbic acid (VITAMIN C) 500 mg tablet Take  by mouth.  glucose blood VI test strips (BLOOD GLUCOSE TEST) strip Check Blood Sugar 6-7 times a week    cholecalciferol, vitamin D3, (VITAMIN D-3) 2,000 unit Tab Take 2,000 Units by mouth daily.  aspirin delayed-release 325 mg tablet Take 325 mg by mouth daily.  MULTIVITAMINS (MULTIVITAMIN PO) Take  by mouth daily. Centrum silver    DOCOSAHEXANOIC ACID/EPA (FISH OIL PO) Take 1,000 mg by mouth two (2) times a day. No current facility-administered medications for this visit. FH: family history includes Alcohol abuse in her brother; Diabetes in her brother, mother, and sister; Heart Attack in her father; Heart Disease in her father, mother, and sister; Hypertension in her father and mother; Kidney Disease in her sister and sister; Stroke in her mother; Tuberculosis in her brother. SH:  reports that she has never smoked. She has never used smokeless tobacco. She reports current alcohol use. She reports that she does not use drugs.      Review of Systems - History obtained from the patient  General ROS: no fever, chills, fatigue, body aches  Psychological ROS: no change in anxiety, depression, SI/HI  Ophthalmic ROS: no blurred vision, myopia, double vision  ENT ROS: no dysphagia, otalgia, otorrhea, rhinorrhea, post nasal drip  Respiratory ROS: no cough, shortness of breath, or wheezing  Cardiovascular ROS: no chest pain or dyspnea on exertion  Gastrointestinal ROS: no abdominal pain, change in bowel habits, or black or bloody stools  Genito-Urinary ROS: no frequency, urgency, incontinence, dysuria, hematuria  Musculoskeletal ROS: no arthralgia, myalgia  Neurological ROS: no headaches, dizziness, lightheadedness, tremors, seizures  Dermatological ROS: no rash or lesions    OBJECTIVE:   Vitals:   Visit Vitals  BP (!) 170/78   Temp 97.8 °F (36.6 °C) (Temporal)   Resp 18   Ht 5' 3\" (1.6 m)   Wt 221 lb 6.4 oz (100.4 kg)   SpO2 97%   BMI 39.22 kg/m²      Gen: Pleasant 66 y.o.  female in NAD. HEART: RRR, No M/G/R.      LUNGS: CTAB No W/R.    NEURO: Alert and oriented x 3. Cranial nerves grossly intact. No focal sensory or motor deficits noted. ASSESSMENT/ PLAN: Diagnoses and all orders for this visit:    1. Essential hypertension, benign    2. Severe obesity (BMI 35.0-35.9 with comorbidity) ([address]    3. Dyslipidemia      1. Essential Hypertension  BP seems to be well controlled. I recommended continuing current dose of Benicar HCT 20-12.5 mg, eating a low sodium diet, and increasing exercise. Recheck CMP. 2. Severe obesity (BMI 35.0-35.9 with comorbidity)    I advised the pt to continue her walking when possible and incorporating some more plant based meals in her diet. 3.Dyslipidemia   I recommended continuing current dose of Tricor 48 mg tablet, eating a low fat diet, and increasing exercise. Recheck lipid panel. I reviewed her labs from 09/15/21 and note her A1c was 6.9 which was slightly higher than her A1c in 03/16/20 which was 6.7. Pt's cholesterol increased since 04/14/21, her total cholesterol is 261 and LDL is 183. Of note, her CMP revealed a slight elevation in glucose but was otherwise normal. I advised her to try eating more plant based for a few days a week and along with limiting the use of honey. ICD-10-CM ICD-9-CM    1.  Essential hypertension, benign I10 401.1    2. Severe obesity (BMI 35.0-35.9 with comorbidity) (HCC)  E66.01 278.01     Z68.35 V85.35    3. Dyslipidemia  E78.5 272.4       Follow-up and Dispositions    · Return in about 6 months (around 4/4/2022) for follow up. I have reviewed the patient's medications and risks/side effects/benefits were discussed. Diagnosis(-es) explained to patient and questions answered. Literature provided where appropriate.      Written by Chelsea Melo, as dictated by Lottie Soares MD.

## 2021-11-02 DIAGNOSIS — E11.9 CONTROLLED TYPE 2 DIABETES MELLITUS WITHOUT COMPLICATION, WITHOUT LONG-TERM CURRENT USE OF INSULIN (HCC): ICD-10-CM

## 2021-11-12 DIAGNOSIS — E11.9 CONTROLLED TYPE 2 DIABETES MELLITUS WITHOUT COMPLICATION, WITHOUT LONG-TERM CURRENT USE OF INSULIN (HCC): ICD-10-CM

## 2021-11-14 RX ORDER — LANCETS
EACH MISCELLANEOUS
Qty: 200 EACH | Refills: 11 | Status: SHIPPED | OUTPATIENT
Start: 2021-11-14

## 2022-05-02 DIAGNOSIS — I10 ESSENTIAL HYPERTENSION, BENIGN: ICD-10-CM

## 2022-05-03 RX ORDER — OLMESARTAN MEDOXOMIL AND HYDROCHLOROTHIAZIDE 20/12.5 20; 12.5 MG/1; MG/1
TABLET ORAL
Qty: 30 TABLET | Refills: 9 | Status: SHIPPED | OUTPATIENT
Start: 2022-05-03

## 2022-05-10 DIAGNOSIS — E78.5 DYSLIPIDEMIA: ICD-10-CM

## 2022-05-13 RX ORDER — FENOFIBRATE 48 MG/1
TABLET, COATED ORAL
Qty: 90 TABLET | Refills: 2 | Status: SHIPPED | OUTPATIENT
Start: 2022-05-13

## 2022-06-06 ENCOUNTER — TRANSCRIBE ORDER (OUTPATIENT)
Dept: SCHEDULING | Age: 72
End: 2022-06-06

## 2022-06-06 DIAGNOSIS — Z12.31 SCREENING MAMMOGRAM FOR HIGH-RISK PATIENT: Primary | ICD-10-CM

## 2022-06-08 ENCOUNTER — OFFICE VISIT (OUTPATIENT)
Dept: INTERNAL MEDICINE CLINIC | Age: 72
End: 2022-06-08
Payer: MEDICARE

## 2022-06-08 VITALS
DIASTOLIC BLOOD PRESSURE: 86 MMHG | WEIGHT: 221.8 LBS | TEMPERATURE: 97.3 F | RESPIRATION RATE: 16 BRPM | HEART RATE: 68 BPM | OXYGEN SATURATION: 100 % | SYSTOLIC BLOOD PRESSURE: 138 MMHG | BODY MASS INDEX: 39.3 KG/M2 | HEIGHT: 63 IN

## 2022-06-08 DIAGNOSIS — M70.61 TROCHANTERIC BURSITIS OF RIGHT HIP: ICD-10-CM

## 2022-06-08 DIAGNOSIS — E66.01 SEVERE OBESITY (BMI 35.0-39.9) WITH COMORBIDITY (HCC): ICD-10-CM

## 2022-06-08 DIAGNOSIS — E11.9 CONTROLLED TYPE 2 DIABETES MELLITUS WITHOUT COMPLICATION, WITHOUT LONG-TERM CURRENT USE OF INSULIN (HCC): Primary | ICD-10-CM

## 2022-06-08 DIAGNOSIS — I10 ESSENTIAL HYPERTENSION, BENIGN: ICD-10-CM

## 2022-06-08 DIAGNOSIS — E78.5 DYSLIPIDEMIA: ICD-10-CM

## 2022-06-08 PROCEDURE — 99214 OFFICE O/P EST MOD 30 MIN: CPT | Performed by: FAMILY MEDICINE

## 2022-06-08 PROCEDURE — G8510 SCR DEP NEG, NO PLAN REQD: HCPCS | Performed by: FAMILY MEDICINE

## 2022-06-08 PROCEDURE — G0463 HOSPITAL OUTPT CLINIC VISIT: HCPCS | Performed by: FAMILY MEDICINE

## 2022-06-08 PROCEDURE — 1101F PT FALLS ASSESS-DOCD LE1/YR: CPT | Performed by: FAMILY MEDICINE

## 2022-06-08 PROCEDURE — G8536 NO DOC ELDER MAL SCRN: HCPCS | Performed by: FAMILY MEDICINE

## 2022-06-08 PROCEDURE — 3017F COLORECTAL CA SCREEN DOC REV: CPT | Performed by: FAMILY MEDICINE

## 2022-06-08 PROCEDURE — 2022F DILAT RTA XM EVC RTNOPTHY: CPT | Performed by: FAMILY MEDICINE

## 2022-06-08 PROCEDURE — G9899 SCRN MAM PERF RSLTS DOC: HCPCS | Performed by: FAMILY MEDICINE

## 2022-06-08 PROCEDURE — 1090F PRES/ABSN URINE INCON ASSESS: CPT | Performed by: FAMILY MEDICINE

## 2022-06-08 PROCEDURE — G8399 PT W/DXA RESULTS DOCUMENT: HCPCS | Performed by: FAMILY MEDICINE

## 2022-06-08 PROCEDURE — G8417 CALC BMI ABV UP PARAM F/U: HCPCS | Performed by: FAMILY MEDICINE

## 2022-06-08 PROCEDURE — G8427 DOCREV CUR MEDS BY ELIG CLIN: HCPCS | Performed by: FAMILY MEDICINE

## 2022-06-08 NOTE — PROGRESS NOTES
SUBJECTIVE:   Ms. Aldair Calvillo is a 74 y.o. female who is here for follow up of routine medical issues. She has followed up with a podiatrist. She notes improvement of her feet. HTN: Pt's BP in the office today was elevated at 138/86 . Pt is compliant in taking medications. Patient denies chest pain, FIGUEROA/SOB, edema, headache, visual changes, dizziness, palpitations or syncope. Her weight is stable. She walks up and down the stairs, and she tries to stay active by walking. Groin Pain: She notes groin pain which started a week ago. This pain increases as she stands up from a sitting position. She has an appointment with her ENT to review her ear discomfort. She tries to follow a diabetic diet and controls her portions. At this time, she is otherwise doing well and has brought no other complaints to my attention today. For a list of the medical issues addressed today, see the assessment and plan below. PREVENTIVE:  Mammogram: Scheduled 07/2022  Pneumonia vaccine: UTD  Shingrix: Deferred  Eye Exam:  Scheduled 11/2022      PMH:   Past Medical History:   Diagnosis Date    Diabetes ([facility] [address]     DJD (degenerative joint disease)     NORDT    GERD (gastroesophageal reflux disease)     Hyperlipidemia     Hypertension     Obesity     Positive tuberculin test 1968    Tx INH     PSH:  has a past surgical history that includes hx total abdominal hysterectomy (1994); pr repair rotator cuff,chronic (02/2004); hc mammo diag bilat (04/21/08); endoscopy, colon, diagnostic (10/02/00); hx orthopaedic (01/04); hx knee arthroscopy (Right, 1999); hx shoulder arthroscopy; hx barry and bso; colorectal scrn; hi risk ind (8/23/2016); colonoscopy (N/A, 8/23/2016); hx carpal tunnel release (12/19/2017); hx carpal tunnel release (Right, 12/2019); and hx breast biopsy (Left, 1988).     All: is allergic to ampicillin (bulk), astelin [azelastine], lipitor [atorvastatin], pravachol [pravastatin], welchol [colesevelam], zetia [ezetimibe], and zocor [simvastatin]. MEDS:   Current Outpatient Medications   Medication Sig    fenofibrate nanocrystallized (TRICOR) 48 mg tablet TAKE 1 TABLET BY MOUTH EVERY DAY    olmesartan-hydroCHLOROthiazide (BENICAR HCT) 20-12.5 mg per tablet TAKE 1 TABLET BY MOUTH DAILY    glucose blood VI test strips (ASCENSIA AUTODISC VI, ONE TOUCH ULTRA TEST VI) strip One touch test strips to test blood  Glucose twice daily E11.9    lancets misc Check blood glucose daily.  ONETOUCH ULTRA TEST strip USE TO TEST BLOOD SUGAR TWICE DAILY    fluticasone (FLONASE) 50 mcg/actuation nasal spray 2 Sprays by Both Nostrils route daily.  ascorbic acid (VITAMIN C) 500 mg tablet Take  by mouth.  glucose blood VI test strips (BLOOD GLUCOSE TEST) strip Check Blood Sugar 6-7 times a week    cholecalciferol, vitamin D3, (VITAMIN D-3) 2,000 unit Tab Take 2,000 Units by mouth daily.  aspirin delayed-release 325 mg tablet Take 325 mg by mouth daily.  MULTIVITAMINS (MULTIVITAMIN PO) Take  by mouth daily. Centrum silver    DOCOSAHEXANOIC ACID/EPA (FISH OIL PO) Take 1,000 mg by mouth two (2) times a day.  doxycycline (VIBRAMYCIN) 100 mg capsule TAKE 1 CAPSULE BY MOUTH EVERY 12 HOURS     No current facility-administered medications for this visit. FH: family history includes Alcohol abuse in her brother; Diabetes in her brother, mother, and sister; Heart Attack in her father; Heart Disease in her father, mother, and sister; Hypertension in her father and mother; Kidney Disease in her sister and sister; Stroke in her mother; Tuberculosis in her brother. SH:  reports that she has never smoked. She has never used smokeless tobacco. She reports current alcohol use. She reports that she does not use drugs.      Review of Systems - History obtained from the patient  General ROS: no fever, chills, fatigue, body aches  Psychological ROS: no change in anxiety, depression, SI/HI  Ophthalmic ROS: no blurred vision, myopia, double vision  ENT ROS: no dysphagia, otalgia, otorrhea, rhinorrhea, post nasal drip  Respiratory ROS: no cough, shortness of breath, or wheezing  Cardiovascular ROS: no chest pain or dyspnea on exertion  Gastrointestinal ROS: no abdominal pain, change in bowel habits, or black or bloody stools  Genito-Urinary ROS: no frequency, urgency, incontinence, dysuria, hematouria  Musculoskeletal ROS: no arthralagia, myalgia  Neurological ROS: no headaches, dizziness, lightheadedness, tremors, seizures  Dermatological ROS: no rash or lesions    OBJECTIVE:   Vitals:   Visit Vitals  /86 (BP 1 Location: Left arm, BP Patient Position: Sitting, BP Cuff Size: Large adult)   Pulse 68   Temp 97.3 °F (36.3 °C) (Temporal)   Resp 16   Ht 5' 3\" (1.6 m)   Wt 221 lb 12.8 oz (100.6 kg)   SpO2 100%   BMI 39.29 kg/m²      Gen: Pleasant 74 y.o.  female in NAD. HEENT: PERRLA. EOMI. OP moist and pink. Neck: Supple. No LAD. HEART: RRR, No M/G/R.      LUNGS: CTAB No W/R. ABDOMEN: S, NT, ND, BS+. EXTREMITIES: Warm. No C/C/E.    MUSCULOSKELETAL: Normal ROM, muscle strength 5/5 all groups. +Hip Tenderness  NEURO: Alert and oriented x 3. Cranial nerves grossly intact. No focal sensory or motor deficits noted. SKIN: Warm. Dry. No rashes or other lesions noted. ASSESSMENT/ PLAN: Diagnoses and all orders for this visit:    1. Controlled type 2 diabetes mellitus without complication, without long-term current use of insulin ([facility])  -     METABOLIC PANEL, COMPREHENSIVE; Future  -     HEMOGLOBIN A1C WITH EAG; Future  -     MICROALBUMIN, UR, RAND W/ MICROALB/CREAT RATIO; Future    2. Essential hypertension, benign  -     METABOLIC PANEL, COMPREHENSIVE; Future  -     CBC WITH AUTOMATED DIFF; Future    3. Dyslipidemia  -     LIPID PANEL; Future    4. Severe obesity (BMI 35.0-39. 9) with comorbidity ([facility]    5.  Trochanteric bursitis of right hip  -     REFERRAL TO ORTHOPEDICS    Controlled type 2 diabetes mellitus without complication, without long-term current use of insulin ([facility] [address]): I ordered a CMP, Hgb A1c, and microalbumin test.     Essential hypertension: BP seems to be well controlled. I recommended continuing current dose of medications, eating a low sodium diet, and increasing exercise. Recheck CMP and CBC. Dyslipidemia: I ordered a lipid panel. Trochanteric Bursitis of Right Hip: I referred pt to Dr. Reddy Schmidt      ICD-10-CM ICD-9-CM    1. Controlled type 2 diabetes mellitus without complication, without long-term current use of insulin ([facility])  C81.9 458.60 METABOLIC PANEL, COMPREHENSIVE      HEMOGLOBIN A1C WITH EAG      MICROALBUMIN, UR, RAND W/ MICROALB/CREAT RATIO   2. Essential hypertension, benign  O46 999.7 METABOLIC PANEL, COMPREHENSIVE      CBC WITH AUTOMATED DIFF   3. Dyslipidemia  E78.5 272.4 LIPID PANEL   4. Severe obesity (BMI 35.0-39. 9) with comorbidity ([facility] [address]  E66.01 278.01    5. Trochanteric bursitis of right hip  M70.61 726.5 REFERRAL TO ORTHOPEDICS        Follow-up and Dispositions    · Return in about 6 months (around 12/8/2022) for AWV. I have reviewed the patient's medications and risks/side effects/benefits were discussed. Diagnosis(-es) explained to patient and questions answered. Literature provided where appropriate.      Written by Pedro Matthews, as dictated by Shandra Lovett MD.

## 2022-06-08 NOTE — PROGRESS NOTES
1. \"Have you been to the ER, urgent care clinic since your last visit? Hospitalized since your last visit? \" No    2. \"Have you seen or consulted any other health care providers outside of the [address] [place] [address] since your last visit? \" No     3. For patients aged 39-70: Has the patient had a colonoscopy / FIT/ Cologuard? Yes - no Care Gap present      If the patient is female:    4. For patients aged 41-77: Has the patient had a mammogram within the past 2 years? NA - based on age or sex      11. For patients aged 21-65: Has the patient had a pap smear?  NA - based on age or sex

## 2022-06-09 LAB
ALBUMIN SERPL-MCNC: 4.6 G/DL (ref 3.7–4.7)
ALBUMIN/CREAT UR: 10 MG/G CREAT (ref 0–29)
ALBUMIN/GLOB SERPL: 1.5 {RATIO} (ref 1.2–2.2)
ALP SERPL-CCNC: 116 IU/L (ref 44–121)
ALT SERPL-CCNC: 26 IU/L (ref 0–32)
AST SERPL-CCNC: 20 IU/L (ref 0–40)
BASOPHILS # BLD AUTO: 0.1 X10E3/UL (ref 0–0.2)
BASOPHILS NFR BLD AUTO: 1 %
BILIRUB SERPL-MCNC: 0.4 MG/DL (ref 0–1.2)
BUN SERPL-MCNC: 15 MG/DL (ref 8–27)
BUN/CREAT SERPL: 21 (ref 12–28)
CALCIUM SERPL-MCNC: 9.3 MG/DL (ref 8.7–10.3)
CHLORIDE SERPL-SCNC: 102 MMOL/L (ref 96–106)
CHOLEST SERPL-MCNC: 239 MG/DL (ref 100–199)
CO2 SERPL-SCNC: 20 MMOL/L (ref 20–29)
CREAT SERPL-MCNC: 0.7 MG/DL (ref 0.57–1)
CREAT UR-MCNC: 106.5 MG/DL
EGFR: 91 ML/MIN/1.73
EOSINOPHIL # BLD AUTO: 0.1 X10E3/UL (ref 0–0.4)
EOSINOPHIL NFR BLD AUTO: 2 %
ERYTHROCYTE [DISTWIDTH] IN BLOOD BY AUTOMATED COUNT: 13.7 % (ref 11.7–15.4)
EST. AVERAGE GLUCOSE BLD GHB EST-MCNC: 157 MG/DL
GLOBULIN SER CALC-MCNC: 3 G/DL (ref 1.5–4.5)
GLUCOSE SERPL-MCNC: 116 MG/DL (ref 65–99)
HBA1C MFR BLD: 7.1 % (ref 4.8–5.6)
HCT VFR BLD AUTO: 37.9 % (ref 34–46.6)
HDLC SERPL-MCNC: 69 MG/DL
HGB BLD-MCNC: 12.2 G/DL (ref 11.1–15.9)
IMM GRANULOCYTES # BLD AUTO: 0 X10E3/UL (ref 0–0.1)
IMM GRANULOCYTES NFR BLD AUTO: 0 %
LDLC SERPL CALC-MCNC: 159 MG/DL (ref 0–99)
LYMPHOCYTES # BLD AUTO: 2.3 X10E3/UL (ref 0.7–3.1)
LYMPHOCYTES NFR BLD AUTO: 46 %
MCH RBC QN AUTO: 26.9 PG (ref 26.6–33)
MCHC RBC AUTO-ENTMCNC: 32.2 G/DL (ref 31.5–35.7)
MCV RBC AUTO: 84 FL (ref 79–97)
MICROALBUMIN UR-MCNC: 10.7 UG/ML
MONOCYTES # BLD AUTO: 0.5 X10E3/UL (ref 0.1–0.9)
MONOCYTES NFR BLD AUTO: 9 %
NEUTROPHILS # BLD AUTO: 2.2 X10E3/UL (ref 1.4–7)
NEUTROPHILS NFR BLD AUTO: 42 %
PLATELET # BLD AUTO: 257 X10E3/UL (ref 150–450)
POTASSIUM SERPL-SCNC: 4 MMOL/L (ref 3.5–5.2)
PROT SERPL-MCNC: 7.6 G/DL (ref 6–8.5)
RBC # BLD AUTO: 4.54 X10E6/UL (ref 3.77–5.28)
SODIUM SERPL-SCNC: 141 MMOL/L (ref 134–144)
TRIGL SERPL-MCNC: 68 MG/DL (ref 0–149)
VLDLC SERPL CALC-MCNC: 11 MG/DL (ref 5–40)
WBC # BLD AUTO: 5.2 X10E3/UL (ref 3.4–10.8)

## 2022-06-25 NOTE — PROGRESS NOTES
CMP:Normal electrolyte levels except for an elevation in the glucose level, normal renal and liver function   Lipid panel: Improved  Your current lab results reveal a elevated total cholesterol and ldl. Your total cholesterol should be under 200 and your ldl under 100. Work on following a low fat diet and exercise at least three times a week. Microalbumin: Normal  A1c:Your current hgbA1c is higher than your last level. Work on following a diabetic diet and exercise. Recheck this test: hgbA1c  in  3 months. Continue with current  medications.       CBC: Normal

## 2022-07-01 ENCOUNTER — HOSPITAL ENCOUNTER (OUTPATIENT)
Dept: MAMMOGRAPHY | Age: 72
Discharge: HOME | End: 2022-07-01
Attending: FAMILY MEDICINE
Payer: MEDICARE

## 2022-07-01 DIAGNOSIS — Z12.31 SCREENING MAMMOGRAM FOR HIGH-RISK PATIENT: ICD-10-CM

## 2022-07-01 PROCEDURE — 77063 BREAST TOMOSYNTHESIS BI: CPT

## 2022-12-12 ENCOUNTER — OFFICE VISIT (OUTPATIENT)
Dept: INTERNAL MEDICINE CLINIC | Age: 72
End: 2022-12-12
Payer: MEDICARE

## 2022-12-12 VITALS
BODY MASS INDEX: 39.27 KG/M2 | HEART RATE: 73 BPM | OXYGEN SATURATION: 98 % | RESPIRATION RATE: 18 BRPM | DIASTOLIC BLOOD PRESSURE: 70 MMHG | WEIGHT: 221.6 LBS | SYSTOLIC BLOOD PRESSURE: 155 MMHG | HEIGHT: 63 IN | TEMPERATURE: 97.3 F

## 2022-12-12 DIAGNOSIS — Z13.6 SCREENING FOR ISCHEMIC HEART DISEASE: ICD-10-CM

## 2022-12-12 DIAGNOSIS — Z78.0 POSTMENOPAUSAL STATE: ICD-10-CM

## 2022-12-12 DIAGNOSIS — Z00.00 MEDICARE ANNUAL WELLNESS VISIT, SUBSEQUENT: Primary | ICD-10-CM

## 2022-12-12 DIAGNOSIS — Z71.89 ADVANCED DIRECTIVES, COUNSELING/DISCUSSION: ICD-10-CM

## 2022-12-12 DIAGNOSIS — E11.9 CONTROLLED TYPE 2 DIABETES MELLITUS WITHOUT COMPLICATION, WITHOUT LONG-TERM CURRENT USE OF INSULIN (HCC): ICD-10-CM

## 2022-12-12 DIAGNOSIS — Z13.31 SCREENING FOR DEPRESSION: ICD-10-CM

## 2022-12-12 PROCEDURE — G0439 PPPS, SUBSEQ VISIT: HCPCS | Performed by: FAMILY MEDICINE

## 2022-12-12 PROCEDURE — 1101F PT FALLS ASSESS-DOCD LE1/YR: CPT | Performed by: FAMILY MEDICINE

## 2022-12-12 PROCEDURE — G8510 SCR DEP NEG, NO PLAN REQD: HCPCS | Performed by: FAMILY MEDICINE

## 2022-12-12 PROCEDURE — G9899 SCRN MAM PERF RSLTS DOC: HCPCS | Performed by: FAMILY MEDICINE

## 2022-12-12 PROCEDURE — G8417 CALC BMI ABV UP PARAM F/U: HCPCS | Performed by: FAMILY MEDICINE

## 2022-12-12 PROCEDURE — G8399 PT W/DXA RESULTS DOCUMENT: HCPCS | Performed by: FAMILY MEDICINE

## 2022-12-12 PROCEDURE — G8536 NO DOC ELDER MAL SCRN: HCPCS | Performed by: FAMILY MEDICINE

## 2022-12-12 PROCEDURE — 2022F DILAT RTA XM EVC RTNOPTHY: CPT | Performed by: FAMILY MEDICINE

## 2022-12-12 PROCEDURE — G8427 DOCREV CUR MEDS BY ELIG CLIN: HCPCS | Performed by: FAMILY MEDICINE

## 2022-12-12 PROCEDURE — 3017F COLORECTAL CA SCREEN DOC REV: CPT | Performed by: FAMILY MEDICINE

## 2022-12-12 PROCEDURE — G9231 DOC ESRD DIA TRANS PREG: HCPCS | Performed by: FAMILY MEDICINE

## 2022-12-12 NOTE — ACP (ADVANCE CARE PLANNING)
Advance Care Planning     General Advance Care Planning (ACP) Conversation      Date of Conversation: 12/12/2022  Conducted with: Patient with Decision Making Capacity    Healthcare Decision Maker:   No healthcare decision makers have been documented.    Click here to complete [address] including selection of the Healthcare Decision Maker Relationship (ie \"Primary\")        Content/Action Overview:   Has NO ACP documents/care preferences - information provided, considering goals and options  Reviewed DNR/DNI and patient elects Full Code (Attempt Resuscitation)         Length of Voluntary ACP Conversation in minutes:  <16 minutes (Non-Billable)    Gloria Ennis MD

## 2022-12-12 NOTE — PROGRESS NOTES
SUBJECTIVE:   Ms. Twila Wood is a 74 y.o. female who is here for follow up of routine medical issues. She presents for a medicare annual wellness visit. Pt has not completed advanced care documents. She would like to be full code. HTN: Pt is compliant in taking olmesartan-HCTZ 20-12.5 mg. Pt's BP in the office today was elevated at 155/70. She notes occasional edema when remaining seated too long. Patient denies chest pain, FIGUEROA/SOB, headache, visual changes, dizziness, palpitations or syncope. Pt had cataract surgery and is doing well. She notes some dry eyes and tearing, but consulted the ophthalmologist regarding this. She recently received an intra-articular injection in the right hip and is doing well. She reports bilateral ear canal itching, for which she saw dermatology. She was prescribed a steroid cream for this. Pt also reports itching on her upper back. She notes scratching the area. Pt walks and climbs stairs for exercise. PREVENTIVE:  Colonoscopy: 1/17/2023  Mammogram: 7/1/2022  Shingrix: declined  Flu: declined  COVID: UTD  Eye Exam: 11/2022    Pt specifically denies changes in vision or hearing, trouble with swallowing or taste, CP, SOB, heartburn or upset stomach, change in bowel habits, problems urinating, unusual joint or muscle pains, numbness or tingling in extremities, skin lesions of concern, or memory concerns. At this time, she is otherwise doing well and has brought no other complaints to my attention today. For a list of the medical issues addressed today, see the assessment and plan below.     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 All: is allergic to ampicillin (bulk), astelin [azelastine], lipitor [atorvastatin], pravachol [pravastatin], welchol [colesevelam], zetia [ezetimibe], and zocor [simvastatin]. MEDS:   Current Outpatient Medications   Medication Sig    fenofibrate nanocrystallized (TRICOR) 48 mg tablet TAKE 1 TABLET BY MOUTH EVERY DAY    olmesartan-hydroCHLOROthiazide (BENICAR HCT) 20-12.5 mg per tablet TAKE 1 TABLET BY MOUTH DAILY    glucose blood VI test strips (ASCENSIA AUTODISC VI, ONE TOUCH ULTRA TEST VI) strip One touch test strips to test blood  Glucose twice daily E11.9    lancets misc Check blood glucose daily. ONETOUCH ULTRA TEST strip USE TO TEST BLOOD SUGAR TWICE DAILY    fluticasone (FLONASE) 50 mcg/actuation nasal spray 2 Sprays by Both Nostrils route daily. ascorbic acid (VITAMIN C) 500 mg tablet Take  by mouth. glucose blood VI test strips (BLOOD GLUCOSE TEST) strip Check Blood Sugar 6-7 times a week    cholecalciferol, vitamin D3, 50 mcg (2,000 unit) tab Take 2,000 Units by mouth daily. aspirin delayed-release 325 mg tablet Take 325 mg by mouth daily. MULTIVITAMINS (MULTIVITAMIN PO) Take  by mouth daily. Centrum silver    DOCOSAHEXANOIC ACID/EPA (FISH OIL PO) Take 1,000 mg by mouth two (2) times a day. No current facility-administered medications for this visit. FH: family history includes Alcohol abuse in her brother; Diabetes in her brother, mother, and sister; Heart Attack in her father; Heart Disease in her father, mother, and sister;  Hypertension in her father and mother; Kidney Disease in her sister and sister; Stroke in her mother; Tuberculosis in her brother. SH:  reports that she has never smoked. She has never used smokeless tobacco. She reports current alcohol use. She reports that she does not use drugs. Review of Systems - History obtained from the patient  General ROS: no fever, chills, fatigue, body aches  Psychological ROS: no change in anxiety, depression, SI/HI  Ophthalmic ROS: no blurred vision, myopia, double vision  ENT ROS: no dysphagia, otalgia, otorrhea, rhinorrhea, post nasal drip  Respiratory ROS: no cough, shortness of breath, or wheezing  Cardiovascular ROS: no chest pain or dyspnea on exertion  Gastrointestinal ROS: no abdominal pain, change in bowel habits, or black or bloody stools  Genito-Urinary ROS: no frequency, urgency, incontinence, dysuria, hematouria  Musculoskeletal ROS: no arthralagia, myalgia  Neurological ROS: no headaches, dizziness, lightheadedness, tremors, seizures  Dermatological ROS: no rash or lesions    OBJECTIVE:   Vitals: Visit Vitals  BP (!) 155/70   Pulse 73   Temp 97.3 °F (36.3 °C) (Temporal)   Resp 18   Ht 5' 3\" (1.6 m)   Wt 221 lb 9.6 oz (100.5 kg)   SpO2 98%   BMI 39.25 kg/m²      Gen: Pleasant 74 y.o.  female in NAD. HEENT: PERRLA. EOMI. OP moist and pink. Neck: Supple. No LAD. HEART: RRR, No M/G/R.      LUNGS: CTAB No W/R. ABDOMEN: S, NT, ND, BS+. EXTREMITIES: Warm. No C/C/E.    MUSCULOSKELETAL: Normal ROM, muscle strength 5/5 all groups. NEURO: Alert and oriented x 3. Cranial nerves grossly intact. No focal sensory or motor deficits noted. SKIN: Warm. Dry. No rashes or other lesions noted. ASSESSMENT/ PLAN: Diagnoses and all orders for this visit:    1. Medicare annual wellness visit, subsequent    2. Advanced directives, counseling/discussion  -     FULL CODE    3. Screening for ischemic heart disease  -     LIPID PANEL; Future    4. Postmenopausal state  -     DEXA BONE DENSITY STUDY AXIAL; Future    5.  Screening for depression  -     DEPRESSION SCREEN ANNUAL    6. Body mass index 39.0-39.9, adult    7. Controlled type 2 diabetes mellitus without complication, without long-term current use of insulin (HCC)  -     HEMOGLOBIN A1C WITH EAG; Future  -     METABOLIC PANEL, COMPREHENSIVE; Future    1. Medicare annual wellness visit, subsequent  1. Medicare Annual Wellness Visit  See attached note. 2. Advanced directives, counseling/discussion  Pt would like to be a full code. -     FULL CODE    3. Screening for ischemic heart disease  Lipid panel shows cholesterol seems to be well controlled. I recommended continuing current dose of medications, eating a low fat diet, and increasing exercise. Recheck lipid panel.    -     LIPID PANEL; Future    4. Postmenopausal state   I have ordered a DEXA. -     DEXA BONE DENSITY STUDY AXIAL; Future    5. Screening for depression  Pt is doing well at this time. -     DEPRESSION SCREEN ANNUAL    6. Body mass index 39.0-39.9, adult    7. Controlled type 2 diabetes mellitus without complication, without long-term current use of insulin (HCC)  Pt's blood sugar seems to be well controlled. I advised pt to continue current dose of medications, avoid sugars and starches, and to increase exercise when possible. Recheck hemoglobin A1c and CMP.     -     HEMOGLOBIN A1C WITH EAG; Future  -     METABOLIC PANEL, COMPREHENSIVE; Future      I advised her to use polysporin and Cortaid on her back. There are some signs of excoriation and I asked her to stop scratching the area. ICD-10-CM ICD-9-CM    1. Medicare annual wellness visit, subsequent  Z00.00 V70.0       2. Advanced directives, counseling/discussion  Z71.89 V65.49 FULL CODE      3. Screening for ischemic heart disease  Z13.6 V81.0 LIPID PANEL      4. Postmenopausal state  Z78.0 V49.81 DEXA BONE DENSITY STUDY AXIAL      5. Screening for depression  Z13.31 V79.0 DEPRESSION SCREEN ANNUAL      6.  Body mass index 39.0-39.9, adult  Z68.39 V85.39 7. Controlled type 2 diabetes mellitus without complication, without long-term current use of insulin (HCC)  E11.9 250.00 HEMOGLOBIN A1C WITH EAG      METABOLIC PANEL, COMPREHENSIVE           Follow-up and Dispositions    Return in about 6 months (around 6/12/2023), or if symptoms worsen or fail to improve. I have reviewed the patient's medications and risks/side effects/benefits were discussed. Diagnosis(-es) explained to patient and questions answered. Literature provided where appropriate.      Written by Melonie Barahona, as dictated by Natalie Nielsen MD.

## 2022-12-12 NOTE — PATIENT INSTRUCTIONS
Medicare Wellness Visit, Female     The best way to live healthy is to have a lifestyle where you eat a well-balanced diet, exercise regularly, limit alcohol use, and quit all forms of tobacco/nicotine, if applicable. Regular preventive services are another way to keep healthy. Preventive services (vaccines, screening tests, monitoring & exams) can help personalize your care plan, which helps you manage your own care. Screening tests can find health problems at the earliest stages, when they are easiest to treat. [facility] follows the current, evidence-based guidelines published by the [facility] ([facility]) when recommending preventive services for our patients. Because we follow these guidelines, sometimes recommendations change over time as research supports it. (For example, mammograms used to be recommended annually. Even though Medicare will still pay for an annual mammogram, the newer guidelines recommend a mammogram every two years for women of average risk). Of course, you and your doctor may decide to screen more often for some diseases, based on your risk and your co-morbidities (chronic disease you are already diagnosed with). Preventive services for you include:  - Medicare offers their members a free annual wellness visit, which is time for you and your primary care provider to discuss and plan for your preventive service needs.  Take advantage of this benefit every year!    -Over the age of 72 should receive the recommended pneumonia vaccines.    -All adults should have a flu vaccine yearly.  -All adults should have a tetanus vaccine every 10 years.   -Over the age 48 should receive the shingles vaccines.        -All adults should be screened once for Hepatitis C.  -All adults age 38-68 who are overweight should have a diabetes screening test once every three years.   -Other screening tests and preventive services for persons with diabetes include: an eye exam to screen for diabetic retinopathy, a kidney function test, a foot exam, and stricter control over your cholesterol.   -Cardiovascular screening for adults with routine risk involves an electrocardiogram (ECG) at intervals determined by your doctor.     -Colorectal cancer screenings should be done for adults age 39-70 with no increased risk factors for colorectal cancer. There are a number of acceptable methods of screening for this type of cancer. Each test has its own benefits and drawbacks. Discuss with your doctor what is most appropriate for you during your annual wellness visit. The different tests include: colonoscopy (considered the best screening method), a fecal occult blood test, a fecal DNA test, and sigmoidoscopy.    -Lung cancer screening is recommended annually with a low dose CT scan for adults between age 54 and 68, who have smoked at least 30 pack years (equivalent of 1 pack per day for 30 days), and who is a current smoker or quit less than 15 years ago.    -A bone mass density test is recommended when a woman turns 65 to screen for osteoporosis. This test is only recommended one time, as a screening. Some providers will use this same test as a disease monitoring tool if you already have osteoporosis. -Breast cancer screenings are recommended every other year for women of normal risk, age 54-69.    -Cervical cancer screenings for women over age 72 are only recommended with certain risk factors.      Here is a list of your current Health Maintenance items (your personalized list of preventive services) with a due date:  Health Maintenance Due   Topic Date Due    DTaP/Tdap/Td  (1 - Tdap) Never done    Shingles Vaccine (1 of 2) Never done    Diabetic Foot Care  01/15/2016    Eye Exam  11/20/2016    COVID-19 Vaccine (5 - Booster for Pfizer series) 06/15/2022    Yearly Flu Vaccine (1) Never done

## 2022-12-12 NOTE — PROGRESS NOTES
This is the Subsequent Medicare Annual Wellness Exam, performed 12 months or more after the Initial AWV or the last Subsequent AWV    I have reviewed the patient's medical history in detail and updated the computerized patient record. Assessment/Plan   Education and counseling provided:  Are appropriate based on today's review and evaluation  End-of-Life planning (with patient's consent)  Colorectal cancer screening tests  Cardiovascular screening blood test  Bone mass measurement (DEXA)  Screening for glaucoma    1. Medicare annual wellness visit, subsequent  2. Advanced directives, counseling/discussion  -     FULL CODE  3. Screening for ischemic heart disease  -     LIPID PANEL; Future  4. Postmenopausal state  -     DEXA BONE DENSITY STUDY AXIAL; Future  5. Screening for depression  -     DEPRESSION SCREEN ANNUAL  6. Body mass index 39.0-39.9, adult     Depression Risk Factor Screening     3 most recent PHQ Screens 12/12/2022   Little interest or pleasure in doing things Not at all   Feeling down, depressed, irritable, or hopeless Not at all   Total Score PHQ 2 0       Alcohol & Drug Abuse Risk Screen    Do you average more than 1 drink per night or more than 7 drinks a week:  No    On any one occasion in the past three months have you have had more than 3 drinks containing alcohol:  No          Functional Ability and Level of Safety    Hearing: Hearing is good. Activities of Daily Living: The home contains: no safety equipment. Patient does total self care      Ambulation: with no difficulty     Fall Risk:  Fall Risk Assessment, last 12 mths 12/12/2022   Able to walk? Yes   Fall in past 12 months? 0   Do you feel unsteady? 0   Are you worried about falling 0   Number of falls in past 12 months -   Fall with injury?  -      Abuse Screen:  Patient is not abused       Cognitive Screening    Has your family/caregiver stated any concerns about your memory: no         Health Maintenance Due     Health Maintenance Due   Topic Date Due    DTaP/Tdap/Td series (1 - Tdap) Never done    Shingrix Vaccine Age 49> (1 of 2) Never done    Foot Exam Q1  01/15/2016    Eye Exam Retinal or Dilated  11/20/2016    COVID-19 Vaccine (5 - Booster for Pfizer series) 06/15/2022    Flu Vaccine (1) Never done       Patient Care Team   Patient Care Team:  Cheryl Lucio MD as PCP - General (Family Medicine)  Cheryl Lucio MD as PCP - [facility]  Idris Keith MD (Cardiovascular Disease Physician)  Kristy Engel MD (Gastroenterology)  Vick Sanchez MD (Optometry)    History     Patient Active Problem List   Diagnosis Code    Type II or unspecified type diabetes mellitus without mention of complication, not stated as uncontrolled E11.9    Essential hypertension, benign I10    Dyslipidemia E78.5    Low back pain M54.50    DJD (degenerative joint disease), multiple sites M15.9    GERD (gastroesophageal reflux disease) K21.9    Anxiety F41.9    Neck pain M54.2    Seasonal allergic rhinitis J30.2    Vitamin D deficiency E55.9    Obesity E66.9    Severe obesity (BMI 35.0-39. 9) E66.01     Past Medical History:   Diagnosis Date    Diabetes ([address]     DJD (degenerative joint disease)     NORDT    GERD (gastroesophageal reflux disease)     Hyperlipidemia     Hypertension     Obesity     Positive tuberculin test 1968    Tx INH      Past Surgical History:   Procedure Laterality Date    COLONOSCOPY N/A 8/23/2016    COLONOSCOPY performed by Saima Dunaway MD at [address]; HI RISK IND  8/23/2016         ENDOSCOPY, COLON, DIAGNOSTIC  10/02/00    normal; internal hemorrhoids; Dr. Celine Andersen  04/21/08    negative; f/u 1 yr    HX BREAST BIOPSY Left 1988    benign    HX CARPAL TUNNEL RELEASE  12/19/2017    HX CARPAL TUNNEL RELEASE Right 12/2019    Dr. Larry Monaco ARTHROSCOPY Right 1999    HX ORTHOPAEDIC  01/04    trigger finger    HX SHOULDER ARTHROSCOPY      HX XAVIER AND BSO      HX TOTAL [facility]    Dr. Ana Alexander CUFF,CHRONIC  02/2004    Dr. Isaias Tamayo     Current Outpatient Medications   Medication Sig Dispense Refill    fenofibrate nanocrystallized (TRICOR) 48 mg tablet TAKE 1 TABLET BY MOUTH EVERY DAY 90 Tablet 2    olmesartan-hydroCHLOROthiazide (BENICAR HCT) 20-12.5 mg per tablet TAKE 1 TABLET BY MOUTH DAILY 30 Tablet 9    glucose blood VI test strips (ASCENSIA AUTODISC VI, ONE TOUCH ULTRA TEST VI) strip One touch test strips to test blood  Glucose twice daily E11.9 200 Strip 3    lancets misc Check blood glucose daily. 200 Each 11    ONETOUCH ULTRA TEST strip USE TO TEST BLOOD SUGAR TWICE DAILY 100 Strip 0    fluticasone (FLONASE) 50 mcg/actuation nasal spray 2 Sprays by Both Nostrils route daily. 1 Bottle 3    ascorbic acid (VITAMIN C) 500 mg tablet Take  by mouth. glucose blood VI test strips (BLOOD GLUCOSE TEST) strip Check Blood Sugar 6-7 times a week 100 strip 11    cholecalciferol, vitamin D3, 50 mcg (2,000 unit) tab Take 2,000 Units by mouth daily. aspirin delayed-release 325 mg tablet Take 325 mg by mouth daily. MULTIVITAMINS (MULTIVITAMIN PO) Take  by mouth daily. Centrum silver      DOCOSAHEXANOIC ACID/EPA (FISH OIL PO) Take 1,000 mg by mouth two (2) times a day.        Allergies   Allergen Reactions    Ampicillin (Bulk) Rash    Astelin [Azelastine] Other (comments)     NASAL IRRITATION    Lipitor [Atorvastatin] Other (comments)     Myalgias      Pravachol [Pravastatin] Myalgia    Welchol [Colesevelam] Myalgia    Zetia [Ezetimibe] Other (comments)     Myalgias      Zocor [Simvastatin] Nausea Only       Family History   Problem Relation Age of Onset    Hypertension Mother     Stroke Mother     Diabetes Mother     Heart Disease Mother     Hypertension Father     Heart Disease Father     Heart Attack Father     Heart Disease Sister     Alcohol abuse Brother     Tuberculosis Brother Diabetes Sister     Kidney Disease Sister     Kidney Disease Sister     Diabetes Brother      Social History     Tobacco Use    Smoking status: Never    Smokeless tobacco: Never   Substance Use Topics    Alcohol use:  Yes     Alcohol/week: 0.0 standard drinks     Comment: occasionally         Kate Wilburn MD

## 2022-12-12 NOTE — PROGRESS NOTES
1. \"Have you been to the ER, urgent care clinic since your last visit? Hospitalized since your last visit? \" No    2. \"Have you seen or consulted any other health care providers outside of the [address] [place] [address] since your last visit? \" No     3. For patients aged 39-70: Has the patient had a colonoscopy / FIT/ Cologuard? Yes - no Care Gap present      If the patient is female:    4. For patients aged 41-77: Has the patient had a mammogram within the past 2 years? Yes - no Care Gap present      5. For patients aged 21-65: Has the patient had a pap smear?  Yes - no Care Gap present

## 2023-01-17 ENCOUNTER — ANESTHESIA EVENT (OUTPATIENT)
Dept: ENDOSCOPY | Age: 73
End: 2023-01-17
Payer: MEDICARE

## 2023-01-17 ENCOUNTER — ANESTHESIA (OUTPATIENT)
Dept: ENDOSCOPY | Age: 73
End: 2023-01-17
Payer: MEDICARE

## 2023-01-17 ENCOUNTER — HOSPITAL ENCOUNTER (OUTPATIENT)
Age: 73
Setting detail: OUTPATIENT SURGERY
Discharge: HOME | End: 2023-01-17
Attending: INTERNAL MEDICINE | Admitting: INTERNAL MEDICINE
Payer: MEDICARE

## 2023-01-17 VITALS
BODY MASS INDEX: 38.09 KG/M2 | HEART RATE: 71 BPM | DIASTOLIC BLOOD PRESSURE: 71 MMHG | HEIGHT: 63 IN | RESPIRATION RATE: 26 BRPM | WEIGHT: 215 LBS | SYSTOLIC BLOOD PRESSURE: 134 MMHG | OXYGEN SATURATION: 100 % | TEMPERATURE: 97.9 F

## 2023-01-17 PROCEDURE — 2709999900 HC NON-CHARGEABLE SUPPLY: Performed by: INTERNAL MEDICINE

## 2023-01-17 PROCEDURE — 74011250636 HC RX REV CODE- 250/636: Performed by: INTERNAL MEDICINE

## 2023-01-17 PROCEDURE — 76040000019: Performed by: INTERNAL MEDICINE

## 2023-01-17 PROCEDURE — 76060000031 HC ANESTHESIA FIRST 0.5 HR: Performed by: INTERNAL MEDICINE

## 2023-01-17 PROCEDURE — 74011250636 HC RX REV CODE- 250/636: Performed by: NURSE ANESTHETIST, CERTIFIED REGISTERED

## 2023-01-17 PROCEDURE — 74011000250 HC RX REV CODE- 250: Performed by: NURSE ANESTHETIST, CERTIFIED REGISTERED

## 2023-01-17 RX ORDER — FLUMAZENIL 0.1 MG/ML
0.2 INJECTION INTRAVENOUS
Status: DISCONTINUED | OUTPATIENT
Start: 2023-01-17 | End: 2023-01-17 | Stop reason: HOSPADM

## 2023-01-17 RX ORDER — SODIUM CHLORIDE 0.9 % (FLUSH) 0.9 %
5-40 SYRINGE (ML) INJECTION EVERY 8 HOURS
Status: DISCONTINUED | OUTPATIENT
Start: 2023-01-17 | End: 2023-01-17 | Stop reason: HOSPADM

## 2023-01-17 RX ORDER — PROPOFOL 10 MG/ML
INJECTION, EMULSION INTRAVENOUS AS NEEDED
Status: DISCONTINUED | OUTPATIENT
Start: 2023-01-17 | End: 2023-01-17 | Stop reason: HOSPADM

## 2023-01-17 RX ORDER — DEXTROMETHORPHAN/PSEUDOEPHED 2.5-7.5/.8
1.2 DROPS ORAL
Status: DISCONTINUED | OUTPATIENT
Start: 2023-01-17 | End: 2023-01-17 | Stop reason: HOSPADM

## 2023-01-17 RX ORDER — MIDAZOLAM HYDROCHLORIDE 1 MG/ML
.25-5 INJECTION, SOLUTION INTRAMUSCULAR; INTRAVENOUS
Status: DISCONTINUED | OUTPATIENT
Start: 2023-01-17 | End: 2023-01-17 | Stop reason: HOSPADM

## 2023-01-17 RX ORDER — NALOXONE HYDROCHLORIDE 0.4 MG/ML
0.4 INJECTION, SOLUTION INTRAMUSCULAR; INTRAVENOUS; SUBCUTANEOUS
Status: DISCONTINUED | OUTPATIENT
Start: 2023-01-17 | End: 2023-01-17 | Stop reason: HOSPADM

## 2023-01-17 RX ORDER — SODIUM CHLORIDE 9 MG/ML
100 INJECTION, SOLUTION INTRAVENOUS CONTINUOUS
Status: DISCONTINUED | OUTPATIENT
Start: 2023-01-17 | End: 2023-01-17 | Stop reason: HOSPADM

## 2023-01-17 RX ORDER — LIDOCAINE HYDROCHLORIDE 20 MG/ML
INJECTION, SOLUTION EPIDURAL; INFILTRATION; INTRACAUDAL; PERINEURAL AS NEEDED
Status: DISCONTINUED | OUTPATIENT
Start: 2023-01-17 | End: 2023-01-17 | Stop reason: HOSPADM

## 2023-01-17 RX ORDER — EPINEPHRINE 0.1 MG/ML
1 INJECTION INTRACARDIAC; INTRAVENOUS
Status: DISCONTINUED | OUTPATIENT
Start: 2023-01-17 | End: 2023-01-17 | Stop reason: HOSPADM

## 2023-01-17 RX ORDER — SODIUM CHLORIDE 0.9 % (FLUSH) 0.9 %
5-40 SYRINGE (ML) INJECTION AS NEEDED
Status: DISCONTINUED | OUTPATIENT
Start: 2023-01-17 | End: 2023-01-17 | Stop reason: HOSPADM

## 2023-01-17 RX ORDER — ATROPINE SULFATE 0.1 MG/ML
0.5 INJECTION INTRAVENOUS
Status: DISCONTINUED | OUTPATIENT
Start: 2023-01-17 | End: 2023-01-17 | Stop reason: HOSPADM

## 2023-01-17 RX ADMIN — PROPOFOL 50 MG: 10 INJECTION, EMULSION INTRAVENOUS at 12:38

## 2023-01-17 RX ADMIN — LIDOCAINE HYDROCHLORIDE 20 MG: 20 INJECTION, SOLUTION EPIDURAL; INFILTRATION; INTRACAUDAL; PERINEURAL at 12:31

## 2023-01-17 RX ADMIN — SODIUM CHLORIDE 100 ML/HR: 9 INJECTION, SOLUTION INTRAVENOUS at 12:25

## 2023-01-17 RX ADMIN — PROPOFOL 50 MG: 10 INJECTION, EMULSION INTRAVENOUS at 12:36

## 2023-01-17 RX ADMIN — PROPOFOL 100 MG: 10 INJECTION, EMULSION INTRAVENOUS at 12:32

## 2023-01-17 RX ADMIN — PROPOFOL 50 MG: 10 INJECTION, EMULSION INTRAVENOUS at 12:34

## 2023-01-17 NOTE — PROCEDURES
[facility]                  Colonoscopy Operative Report    1/17/2023      Nehal Rodriguez  [id number]  1950    Procedure Type:   Colonoscopy --diagnostic     Indications:    Rectal bleeding      Pre-operative Diagnosis: see indication above    Post-operative Diagnosis:  See findings below    :  Ana Ospina MD    Referring Provider: Radha Vargas MD      Sedation:  MAC anesthesia Propofol    Pre-Procedural Exam:      Airway: clear,  No airway problems anticipated  Heart: RRR, without gallops or rubs  Lungs: clear bilaterally without wheezes, crackles, or rhonchi  Abdomen: soft, nontender, nondistended, bowel sounds present  Mental Status: awake, alert and oriented to person, place and time     Procedure Details:  After informed consent was obtained with all risks and benefits of procedure explained and preoperative exam completed, the patient was taken to the endoscopy suite and placed in the left lateral decubitus position. Upon sequential sedation as per above, a digital rectal exam was performed . The Olympus videocolonoscope  was inserted in the rectum and carefully advanced to the cecum, which was identified by the ileocecal valve and appendiceal orifice. The cecum was identified by the ileocecal valve and appendiceal orifice. The quality of preparation was good. The colonoscope was slowly withdrawn with careful evaluation between folds. Retroflexion in the rectum was completed demonstrating internal hemorrhoids. Findings:   Rectum: Grade 1 internal hemorrhoid(s); Sigmoid:     - Diverticulosis  Descending Colon: normal  Transverse Colon: normal  Ascending Colon: normal  Cecum: normal  Terminal Ileum: not intubated      Specimen Removed:  none    Complications: None. EBL:  None.     Impression:    normal colonic mucosa throughout  diverticulosis,  Moderate in degree, involving the sigmoid  hemorrhoids internal, Large in size    Recommendations: --Follow up with me for rubber band ligation of the hemorrhoids, -No repeat screening colonoscopy indicated due to age. High fiber diet. Resume normal medication(s). Discharge Disposition:  Home in the company of a  when able to ambulate. Edda Simpson MD    1/17/2023     CHASE Farley MD  [facility], [address]  [address]  [phone]  [email or web address]

## 2023-01-17 NOTE — H&P
Michelle Pringle MD  [facility], [address]  [phone]  [email or web address]    Gastroenterology Outpatient History and Physical    Patient: Singh Paz Minor    Physician: Gregorio Patricia MD    Vital Signs: Pulse 60, resp. rate 14, height 5' 3\" (1.6 m), weight 97.5 kg (215 lb), SpO2 97 %, not currently breastfeeding. Allergies: Allergies   Allergen Reactions    Ampicillin (Bulk) Rash    Astelin [Azelastine] Other (comments)     NASAL IRRITATION    Lipitor [Atorvastatin] Other (comments)     Myalgias      Pravachol [Pravastatin] Myalgia    Welchol [Colesevelam] Myalgia    Zetia [Ezetimibe] Other (comments)     Myalgias      Zocor [Simvastatin] Nausea Only       Chief Complaint: rectal bleeding    History of Present Illness: Here for colonoscopy to evaluated some episodes of painless rectal bleeding. Last colonoscopy was 2016 and showed no polyps.   No FH of colon cancer    History:  Past Medical History:   Diagnosis Date    Diabetes ([address]     DJD (degenerative joint disease)     NORDT    GERD (gastroesophageal reflux disease)     Hyperlipidemia     Hypertension     Obesity     Positive tuberculin test 1968    Tx INH    PUD (peptic ulcer disease)       Past Surgical History:   Procedure Laterality Date    COLONOSCOPY N/A 08/23/2016    COLONOSCOPY performed by Gregorio Patricia MD at [address] [place] [address]; HI RISK IND  08/23/2016         ENDOSCOPY, COLON, DIAGNOSTIC  10/02/2000    normal; internal hemorrhoids; Dr. Meli Dee  04/21/2008    negative; f/u 1 yr    HX BREAST BIOPSY Left 1988    benign    HX CARPAL TUNNEL RELEASE  12/19/2017    HX CARPAL TUNNEL RELEASE Right 12/2019    Dr. Liz Meraz Bilateral     HX KNEE ARTHROSCOPY Right 1999    HX ORTHOPAEDIC  01/2004    trigger finger    HX SHOULDER ARTHROSCOPY      HX XAVIER AND BSO      HX TOTAL [address] CHRONIC  02/2004    Dr. Pastor Sanchez History     Socioeconomic History    Marital status: LEGALLY    Tobacco Use    Smoking status: Never    Smokeless tobacco: Never   Vaping Use    Vaping Use: Never used   Substance and Sexual Activity    Alcohol use: Yes     Alcohol/week: 0.0 standard drinks     Comment: occasionally    Drug use: No    Sexual activity: Not Currently     Partners: Male     Social Determinants of Health     Financial Resource Strain: Low Risk     Difficulty of Paying Living Expenses: Not hard at all   Food Insecurity: No Food Insecurity    Worried About Running Out of Food in the Last Year: Never true    Ran Out of Food in the Last Year: Never true      Family History   Problem Relation Age of Onset    Hypertension Mother     Stroke Mother     Diabetes Mother     Heart Disease Mother     Hypertension Father     Heart Disease Father     Heart Attack Father     Heart Disease Sister     Alcohol abuse Brother     Tuberculosis Brother     Diabetes Sister     Kidney Disease Sister     Kidney Disease Sister     Diabetes Brother       Patient Active Problem List   Diagnosis Code    Type II or unspecified type diabetes mellitus without mention of complication, not stated as uncontrolled E11.9    Essential hypertension, benign I10    Dyslipidemia E78.5    Low back pain M54.50    DJD (degenerative joint disease), multiple sites M15.9    GERD (gastroesophageal reflux disease) K21.9    Anxiety F41.9    Neck pain M54.2    Seasonal allergic rhinitis J30.2    Vitamin D deficiency E55.9    Obesity E66.9    Severe obesity (BMI 35.0-39. 9) E66.01       Medications:   Prior to Admission medications    Medication Sig Start Date End Date Taking?  Authorizing Provider   fenofibrate nanocrystallized (TRICOR) 48 mg tablet TAKE 1 TABLET BY MOUTH EVERY DAY 5/13/22  Yes Aime Oliveira MD   olmesartan-hydroCHLOROthiazide [facility]) 20-12.5 mg per tablet TAKE 1 TABLET BY MOUTH DAILY 5/3/22  Yes Jackson Redd MD   glucose blood VI test strips (ASCENSIA AUTODISC VI, ONE TOUCH ULTRA TEST VI) strip One touch test strips to test blood  Glucose twice daily E11.9 11/14/21  Yes Jackson Redd MD   lancets misc Check blood glucose daily. 11/14/21  Yes Jackson Redd MD   [facility] ULTRA TEST strip USE TO TEST BLOOD SUGAR TWICE DAILY 11/6/17  Yes Florecita Grewal MD   fluticasone [facility]) 50 mcg/actuation nasal spray 2 Sprays by Both Nostrils route daily. Patient taking differently: 2 Sprays by Both Nostrils route as needed. 8/2/17  Yes Stefan Phillips MD   ascorbic acid (VITAMIN C) 500 mg tablet Take  by mouth. Yes Provider, Historical   glucose blood VI test strips (BLOOD GLUCOSE TEST) strip Check Blood Sugar 6-7 times a week 1/15/15  Yes Jackson Redd MD   cholecalciferol, vitamin D3, 50 mcg (2,000 unit) tab Take 2,000 Units by mouth daily. 2/16/11  Yes Provider, Historical   aspirin delayed-release 325 mg tablet Take 325 mg by mouth as needed. 3/5/10  Yes Provider, Historical   MULTIVITAMINS (MULTIVITAMIN PO) Take  by mouth daily. Centrum silver   Yes Provider, Historical   DOCOSAHEXANOIC ACID/EPA (FISH OIL PO) Take 1,000 mg by mouth two (2) times a day.    Yes Provider, Historical       Physical Exam:     General: well developed, well nourished   HEENT: unremarkable   Heart: regular rhythm no mumur    Lungs: clear   Abdominal:  benign   Neurological: unremarkable   Extremities: no edema     Findings/Diagnosis: rectal bleeding    Plan of Care/Planned Procedure: Colonoscopy with monitored anesthesia care sedation    Signed:  Lauren Tiwari MD 1/17/2023

## 2023-01-17 NOTE — PERIOP NOTES
See anesthesia note and MAR for medications given during procedure. Received report from anesthesia staff on vital signs and status of patient.      Endoscope was pre-cleaned at the bedside immediately following procedure by [facility], ET

## 2023-01-17 NOTE — DISCHARGE INSTRUCTIONS
Amie Fiore MD  [facility], [address]  [place], [address] [facility]  [phone]  [email or web address]  [id number]  1950    COLON DISCHARGE INSTRUCTIONS  Discomfort:  Redness at IV site- apply warm compress to area; if redness or soreness persist- contact your physician  There may be a slight amount of blood passed from the rectum  Gaseous discomfort- walking, belching will help relieve any discomfort  You may not operate a vehicle for 12 hours  You may not engage in an occupation involving machinery or appliances for rest of today  You may not drink alcoholic beverages for at least 12 hours  Avoid making any critical decisions for at least 24 hour  DIET:   High fiber diet. - however -  remember your colon is empty and a heavy meal will produce gas. Avoid these foods:  vegetables, fried / greasy foods, carbonated drinks for today      ACTIVITY:  You may resume your normal daily activities it is recommended that you spend the remainder of the day resting -  avoid any strenuous activity. CALL M.D. ANY SIGN OF:   Increasing pain, nausea, vomiting  Abdominal distension (swelling)  New increased bleeding (oral or rectal)  Fever (chills)  Pain in chest area  Bloody discharge from nose or mouth  Shortness of breath     COLONOSCOPY FINDINGS:  Your colonoscopy showed: large internal hemorrhoids and some diverticulosis. Follow-up Instructions:   Call Dr. Amie Fiore if any questions or problems.    Telephone # [phone]  Dr. Eran Snyder office will call you to schedule rubber band ligation of the hemorrhoids   Patient Education on Sedation / Analgesia Administered for Procedure      For 24 hours after general anesthesia or intravenous analgesia / sedation:  Have someone responsible help you with your care  Limit your activities  Do not drive and operate hazardous machinery  Do not make important personal, legal or business decisions  Do not drink alcoholic beverages  If you have not urinated within 8 hours after discharge, please contact your physician  Resume your medications unless otherwise instructed    For 24 hours after general anesthesia or intravenous analgesia / sedation  you may experience:  Drowsiness, dizziness, sleepiness, or confusion  Difficulty remembering or delayed reaction times  Difficulty with your balance, especially while walking, move slowly and carefully, do not make sudden position changes  Difficulty focusing or blurred vision    You may not be aware of slight changes in your behavior and/or your reaction time because of the medication used during and after your procedure.     Report the following to your physician:  Excessive pain, swelling, redness or odor of or around the surgical area  Temperature over 100.5  Nausea and vomiting lasting longer than 4 hours or if unable to take medications  Any signs of decreased circulation or nerve impairment to extremity: change in color, persistent numbness, tingling, coldness or increase pain  Any questions or concerns    IF YOU REPORT TO AN EMERGENCY ROOM, DOCTOR'S OFFICE OR HOSPITAL WITHIN 24 HOURS AFTER YOUR PROCEDURE, BRING THIS SHEET AND YOUR AFTER VISIT SUMMARY WITH YOU AND GIVE IT TO THE PHYSICIAN OR NURSE ATTENDING YOU.

## 2023-01-17 NOTE — ANESTHESIA POSTPROCEDURE EVALUATION
Procedure(s):  COLONOSCOPY. general, total IV anesthesia    Anesthesia Post Evaluation        Patient location during evaluation: PACU  Note status: Adequate. Level of consciousness: responsive to verbal stimuli and sleepy but conscious  Pain management: satisfactory to patient  Airway patency: patent  Anesthetic complications: no  Cardiovascular status: acceptable  Respiratory status: acceptable  Hydration status: acceptable  Comments: +Post-Anesthesia Evaluation and Assessment    Patient: aKrina Reagan MRN: [id number]  SSN: [id number]   YOB: 1950  Age: 74 y.o. Sex: female      Cardiovascular Function/Vital Signs    /71   Pulse 71   Temp 36.6 °C (97.9 °F)   Resp 26   Ht 5' 3\" (1.6 m)   Wt 97.5 kg (215 lb)   SpO2 100%   Breastfeeding No   BMI 38.09 kg/m²     Patient is status post Procedure(s):  COLONOSCOPY. Nausea/Vomiting: Controlled. Postoperative hydration reviewed and adequate. Pain:  Pain Scale 1: Numeric (0 - 10) (01/17/23 1303)  Pain Intensity 1: 0 (01/17/23 1303)   Managed. Neurological Status: At baseline. Mental Status and Level of Consciousness: Arousable. Pulmonary Status:   O2 Device: None (Room air) (01/17/23 1303)   Adequate oxygenation and airway patent. Complications related to anesthesia: None    Post-anesthesia assessment completed. No concerns. Signed By: Omaira Reagan MD    1/17/2023  Post anesthesia nausea and vomiting:  controlled      INITIAL Post-op Vital signs:   Vitals Value Taken Time   /71 01/17/23 1303   Temp 36.6 °C (97.9 °F) 01/17/23 1254   Pulse 62 01/17/23 1304   Resp 14 01/17/23 1304   SpO2 100 % 01/17/23 1303   Vitals shown include unvalidated device data.

## 2023-01-17 NOTE — ROUTINE PROCESS
Golden Rodriguez  1950  [id number]    Situation:  Verbal report received from: CHASE Ledezma, RN  Procedure: Procedure(s):  COLONOSCOPY    Background:    Preoperative diagnosis: Hematochezia [K92.1]  Hemorrhoids, unspecified hemorrhoid type [K64.9]  Postoperative diagnosis: Hemorrhoids, Diverticulosis     :  Dr. Chacko Sports  Assistant(s): Endoscopy Technician-1: Trell Quezada  Endoscopy RN-1: Mahsa Kamara    Specimens: * No specimens in log *  H. Pylori  no    Assessment:  Intra-procedure medications     Anesthesia gave intra-procedure sedation and medications, see anesthesia flow sheet.     Intravenous fluids: NS@ KVO     Vital signs stable    Abdominal assessment: round and soft    Recommendation:  Discharge patient per MD order  Friend   Permission to share finding with family or friend yes

## 2023-01-17 NOTE — ANESTHESIA PREPROCEDURE EVALUATION
Anesthetic History   No history of anesthetic complications            Review of Systems / Medical History  Patient summary reviewed, nursing notes reviewed and pertinent labs reviewed    Pulmonary  Within defined limits                 Neuro/Psych   Within defined limits           Cardiovascular    Hypertension              Exercise tolerance: >4 METS     GI/Hepatic/Renal     GERD           Endo/Other    Diabetes    Obesity and arthritis  Pertinent negatives: No morbid obesity   Other Findings              Physical Exam    Airway  Mallampati: II  TM Distance: 4 - 6 cm  Neck ROM: normal range of motion   Mouth opening: Normal     Cardiovascular  Regular rate and rhythm,  S1 and S2 normal,  no murmur, click, rub, or gallop             Dental    Dentition: Caps/crowns     Pulmonary  Breath sounds clear to auscultation               Abdominal  GI exam deferred       Other Findings            Anesthetic Plan    ASA: 3  Anesthesia type: general and total IV anesthesia          Induction: Intravenous  Anesthetic plan and risks discussed with: Patient      Propofol MAC

## 2023-02-21 ENCOUNTER — HOSPITAL ENCOUNTER (OUTPATIENT)
Dept: MAMMOGRAPHY | Age: 73
Discharge: HOME | End: 2023-02-21
Attending: FAMILY MEDICINE
Payer: MEDICARE

## 2023-02-21 DIAGNOSIS — Z78.0 POSTMENOPAUSAL STATE: ICD-10-CM

## 2023-02-21 PROCEDURE — 77080 DXA BONE DENSITY AXIAL: CPT

## 2023-02-22 LAB
ALBUMIN SERPL-MCNC: 4.4 G/DL (ref 3.7–4.7)
ALBUMIN/GLOB SERPL: 1.9 {RATIO} (ref 1.2–2.2)
ALP SERPL-CCNC: 105 IU/L (ref 44–121)
ALT SERPL-CCNC: 17 IU/L (ref 0–32)
AST SERPL-CCNC: 17 IU/L (ref 0–40)
BILIRUB SERPL-MCNC: 0.5 MG/DL (ref 0–1.2)
BUN SERPL-MCNC: 14 MG/DL (ref 8–27)
BUN/CREAT SERPL: 21 (ref 12–28)
CALCIUM SERPL-MCNC: 9.4 MG/DL (ref 8.7–10.3)
CHLORIDE SERPL-SCNC: 106 MMOL/L (ref 96–106)
CO2 SERPL-SCNC: 23 MMOL/L (ref 20–29)
CREAT SERPL-MCNC: 0.67 MG/DL (ref 0.57–1)
EGFRCR SERPLBLD CKD-EPI 2021: 92 ML/MIN/1.73
EST. AVERAGE GLUCOSE BLD GHB EST-MCNC: 151 MG/DL
GLOBULIN SER CALC-MCNC: 2.3 G/DL (ref 1.5–4.5)
GLUCOSE SERPL-MCNC: 100 MG/DL (ref 70–99)
HBA1C MFR BLD: 6.9 % (ref 4.8–5.6)
POTASSIUM SERPL-SCNC: 4 MMOL/L (ref 3.5–5.2)
PROT SERPL-MCNC: 6.7 G/DL (ref 6–8.5)
SODIUM SERPL-SCNC: 144 MMOL/L (ref 134–144)

## 2023-02-23 ENCOUNTER — TELEPHONE (OUTPATIENT)
Dept: INTERNAL MEDICINE CLINIC | Age: 73
End: 2023-02-23

## 2023-02-24 LAB
CHOLEST SERPL-MCNC: 215 MG/DL (ref 100–199)
HDLC SERPL-MCNC: 58 MG/DL
LDLC SERPL CALC-MCNC: 144 MG/DL (ref 0–99)
TRIGL SERPL-MCNC: 72 MG/DL (ref 0–149)
VLDLC SERPL CALC-MCNC: 13 MG/DL (ref 5–40)

## 2023-02-26 DIAGNOSIS — M43.9 COMPRESSION DEFORMITY OF VERTEBRA: Primary | ICD-10-CM

## 2023-02-27 NOTE — PROGRESS NOTES
Please contact this patient about additional imaging. Your bone density study is normal.  They did note some come pression deformities of the thoracic spine. I am ordering x-rays of your thoracic spine.

## 2023-02-27 NOTE — PROGRESS NOTES
Lipids: Improved  Your current lab results reveal a elevated total cholesterol and ldl. Your total cholesterol should be under 200 and your ldl under 100. Work on following a low fat diet and exercise at least three times a week.

## 2023-04-06 ENCOUNTER — HOSPITAL ENCOUNTER (OUTPATIENT)
Dept: GENERAL RADIOLOGY | Age: 73
End: 2023-04-06
Payer: MEDICARE

## 2023-04-06 PROCEDURE — 72072 X-RAY EXAM THORAC SPINE 3VWS: CPT

## 2023-06-01 RX ORDER — FENOFIBRATE 48 MG/1
TABLET, COATED ORAL
Qty: 90 TABLET | Refills: 2 | Status: SHIPPED | OUTPATIENT
Start: 2023-06-01

## 2023-06-15 PROBLEM — E11.9 TYPE 2 DIABETES MELLITUS WITHOUT COMPLICATION, WITHOUT LONG-TERM CURRENT USE OF INSULIN (HCC): Status: ACTIVE | Noted: 2023-06-15

## 2023-06-15 PROBLEM — E11.9 TYPE 2 DIABETES MELLITUS (HCC): Status: ACTIVE | Noted: 2023-06-15

## 2023-06-15 PROBLEM — E66.01 SEVERE OBESITY (BMI 35.0-39.9) WITH COMORBIDITY (HCC): Status: ACTIVE | Noted: 2023-06-15

## 2023-06-19 ENCOUNTER — HOSPITAL ENCOUNTER (OUTPATIENT)
Facility: HOSPITAL | Age: 73
Discharge: HOME | End: 2023-06-22
Payer: MEDICARE

## 2023-06-19 DIAGNOSIS — M54.16 RADICULOPATHY, LUMBAR REGION: ICD-10-CM

## 2023-06-19 PROCEDURE — 72148 MRI LUMBAR SPINE W/O DYE: CPT

## 2023-06-20 ENCOUNTER — TRANSCRIBE ORDERS (OUTPATIENT)
Facility: HOSPITAL | Age: 73
End: 2023-06-20

## 2023-06-20 DIAGNOSIS — Z12.31 VISIT FOR SCREENING MAMMOGRAM: Primary | ICD-10-CM

## 2023-07-03 RX ORDER — OLMESARTAN MEDOXOMIL AND HYDROCHLOROTHIAZIDE 20/12.5 20; 12.5 MG/1; MG/1
TABLET ORAL
Qty: 90 TABLET | Refills: 3 | Status: SHIPPED | OUTPATIENT
Start: 2023-07-03

## 2023-07-19 ENCOUNTER — PATIENT MESSAGE (OUTPATIENT)
Age: 73
End: 2023-07-19

## 2023-07-24 ENCOUNTER — OFFICE VISIT (OUTPATIENT)
Age: 73
End: 2023-07-24
Payer: MEDICARE

## 2023-07-24 VITALS
TEMPERATURE: 97.2 F | HEIGHT: 63 IN | HEART RATE: 60 BPM | SYSTOLIC BLOOD PRESSURE: 145 MMHG | WEIGHT: 212 LBS | OXYGEN SATURATION: 98 % | DIASTOLIC BLOOD PRESSURE: 70 MMHG | BODY MASS INDEX: 37.56 KG/M2 | RESPIRATION RATE: 20 BRPM

## 2023-07-24 DIAGNOSIS — R22.1 MASS OF LEFT SIDE OF NECK: Primary | ICD-10-CM

## 2023-07-24 PROCEDURE — 1123F ACP DISCUSS/DSCN MKR DOCD: CPT | Performed by: FAMILY MEDICINE

## 2023-07-24 PROCEDURE — 99213 OFFICE O/P EST LOW 20 MIN: CPT | Performed by: FAMILY MEDICINE

## 2023-07-24 PROCEDURE — G8399 PT W/DXA RESULTS DOCUMENT: HCPCS | Performed by: FAMILY MEDICINE

## 2023-07-24 PROCEDURE — G8417 CALC BMI ABV UP PARAM F/U: HCPCS | Performed by: FAMILY MEDICINE

## 2023-07-24 PROCEDURE — 3078F DIAST BP <80 MM HG: CPT | Performed by: FAMILY MEDICINE

## 2023-07-24 PROCEDURE — G8427 DOCREV CUR MEDS BY ELIG CLIN: HCPCS | Performed by: FAMILY MEDICINE

## 2023-07-24 PROCEDURE — 3077F SYST BP >= 140 MM HG: CPT | Performed by: FAMILY MEDICINE

## 2023-07-24 PROCEDURE — 4004F PT TOBACCO SCREEN RCVD TLK: CPT | Performed by: FAMILY MEDICINE

## 2023-07-24 PROCEDURE — 3017F COLORECTAL CA SCREEN DOC REV: CPT | Performed by: FAMILY MEDICINE

## 2023-07-24 PROCEDURE — 1090F PRES/ABSN URINE INCON ASSESS: CPT | Performed by: FAMILY MEDICINE

## 2023-07-24 SDOH — ECONOMIC STABILITY: FOOD INSECURITY: WITHIN THE PAST 12 MONTHS, THE FOOD YOU BOUGHT JUST DIDN'T LAST AND YOU DIDN'T HAVE MONEY TO GET MORE.: NEVER TRUE

## 2023-07-24 SDOH — ECONOMIC STABILITY: INCOME INSECURITY: HOW HARD IS IT FOR YOU TO PAY FOR THE VERY BASICS LIKE FOOD, HOUSING, MEDICAL CARE, AND HEATING?: NOT VERY HARD

## 2023-07-24 SDOH — ECONOMIC STABILITY: HOUSING INSECURITY
IN THE LAST 12 MONTHS, WAS THERE A TIME WHEN YOU DID NOT HAVE A STEADY PLACE TO SLEEP OR SLEPT IN A SHELTER (INCLUDING NOW)?: NO

## 2023-07-24 SDOH — ECONOMIC STABILITY: FOOD INSECURITY: WITHIN THE PAST 12 MONTHS, YOU WORRIED THAT YOUR FOOD WOULD RUN OUT BEFORE YOU GOT MONEY TO BUY MORE.: NEVER TRUE

## 2023-07-24 ASSESSMENT — ANXIETY QUESTIONNAIRES
IF YOU CHECKED OFF ANY PROBLEMS ON THIS QUESTIONNAIRE, HOW DIFFICULT HAVE THESE PROBLEMS MADE IT FOR YOU TO DO YOUR WORK, TAKE CARE OF THINGS AT HOME, OR GET ALONG WITH OTHER PEOPLE: NOT DIFFICULT AT ALL
GAD7 TOTAL SCORE: 0
1. FEELING NERVOUS, ANXIOUS, OR ON EDGE: 0
4. TROUBLE RELAXING: 0
7. FEELING AFRAID AS IF SOMETHING AWFUL MIGHT HAPPEN: 0
3. WORRYING TOO MUCH ABOUT DIFFERENT THINGS: 0
2. NOT BEING ABLE TO STOP OR CONTROL WORRYING: 0
6. BECOMING EASILY ANNOYED OR IRRITABLE: 0
5. BEING SO RESTLESS THAT IT IS HARD TO SIT STILL: 0

## 2023-07-24 ASSESSMENT — PATIENT HEALTH QUESTIONNAIRE - PHQ9
SUM OF ALL RESPONSES TO PHQ QUESTIONS 1-9: 0
SUM OF ALL RESPONSES TO PHQ QUESTIONS 1-9: 0
2. FEELING DOWN, DEPRESSED OR HOPELESS: 0
1. LITTLE INTEREST OR PLEASURE IN DOING THINGS: 0
SUM OF ALL RESPONSES TO PHQ9 QUESTIONS 1 & 2: 0
SUM OF ALL RESPONSES TO PHQ QUESTIONS 1-9: 0
SUM OF ALL RESPONSES TO PHQ QUESTIONS 1-9: 0

## 2023-07-24 NOTE — PROGRESS NOTES
1. \"Have you been to the ER, urgent care clinic since your last visit? Hospitalized since your last visit? \" No    2. \"Have you seen or consulted any other health care providers outside of the [address] [place] [address] since your last visit? \" No     3. For patients aged 43-73: Has the patient had a colonoscopy / FIT/ Cologuard? Yes - no Care Gap present      If the patient is female:    4. For patients aged 43-66: Has the patient had a mammogram within the past 2 years? NA - based on age or sex      11. For patients aged 21-65: Has the patient had a pap smear?  NA - based on age or sex

## 2023-07-24 NOTE — PROGRESS NOTES
SUBJECTIVE:   Ms. Isak Hector is a 74 y.o. female who is here for neck swelling. Patient has noticed a lump on her neck in the past week. She states that the lump is painless and has not grown. She has not noticed any discharge from the area. She tried to see her Dermatologist (Dr. Celestina De La Cruz?), but the next available appointment was in October. She reports some mucus in her throat at night, but drinking water subsides it. Other than that, she reports no irritation in her throat or ears. At this time, she is otherwise doing well and has brought no other complaints to my attention today. For a list of the medical issues addressed today, see the assessment and plan below. PMH:   Past Medical History:   Diagnosis Date    Diabetes ([address])     DJD (degenerative joint disease)     NORDT    GERD (gastroesophageal reflux disease)     Hyperlipidemia     Hypertension     Obesity     Positive tuberculin test 1968    Tx INH    PUD (peptic ulcer disease)      PSH:  has a past surgical history that includes Hysterectomy, total abdominal (1994); Colonoscopy (N/A, 1/17/2023); Cataract removal (Bilateral); Breast biopsy (Left, 1988); repair rotator cuff,chronic (02/2004); Carpal tunnel release (12/19/2017); Carpal tunnel release (Right, 12/2019); mammo diag bilat (04/21/2008); Colonoscopy (10/02/2000); orthopedic surgery (01/2004); Colonoscopy (N/A, 08/23/2016); colorectal scrn; hi risk ind (08/23/2016); Total abdominal hysterectomy w/ bilateral salpingoophorectomy; Shoulder arthroscopy; and Knee arthroscopy (Right, 1999). All: is allergic to atorvastatin, azelastine, colesevelam, ezetimibe, pravastatin, simvastatin, and ampicillin. MEDS:   Current Outpatient Medications   Medication Sig    olmesartan-hydroCHLOROthiazide (BENICAR HCT) 20-12.5 MG per tablet TAKE 1 TABLET BY MOUTH DAILY    fenofibrate (TRICOR) 48 MG tablet TAKE 1 TABLET BY MOUTH EVERY DAY    Lancets MISC Check blood glucose daily.     ascorbic acid

## 2023-07-25 ENCOUNTER — HOSPITAL ENCOUNTER (OUTPATIENT)
Facility: HOSPITAL | Age: 73
Discharge: HOME | End: 2023-07-28
Attending: FAMILY MEDICINE
Payer: MEDICARE

## 2023-07-25 DIAGNOSIS — Z12.31 VISIT FOR SCREENING MAMMOGRAM: ICD-10-CM

## 2023-07-25 PROCEDURE — 77063 BREAST TOMOSYNTHESIS BI: CPT

## 2023-07-31 ENCOUNTER — HOSPITAL ENCOUNTER (OUTPATIENT)
Facility: HOSPITAL | Age: 73
Discharge: HOME | End: 2023-08-03
Attending: FAMILY MEDICINE
Payer: MEDICARE

## 2023-07-31 DIAGNOSIS — R22.1 MASS OF LEFT SIDE OF NECK: ICD-10-CM

## 2023-07-31 PROCEDURE — 76536 US EXAM OF HEAD AND NECK: CPT

## 2023-08-01 ENCOUNTER — PATIENT MESSAGE (OUTPATIENT)
Age: 73
End: 2023-08-01

## 2023-08-02 ENCOUNTER — PATIENT MESSAGE (OUTPATIENT)
Age: 73
End: 2023-08-02

## 2023-08-10 NOTE — TELEPHONE ENCOUNTER
From: DALJIT HENLEY  To: Alba Longoria A Minor  Sent: 8/2/2023 3:33 PM EDT  Subject: US HEAD NECK SOFT TISSUE THYROID    Good afternoon, I see you recently reviewed your results which Dr. Presley Monk states you have a simple lipoma. If you have any questions or concerns please contact the office. Have a great day.     Valeriy Diego

## 2023-10-26 ENCOUNTER — OFFICE VISIT (OUTPATIENT)
Age: 73
End: 2023-10-26
Payer: MEDICARE

## 2023-10-26 VITALS
HEIGHT: 63 IN | TEMPERATURE: 97.2 F | SYSTOLIC BLOOD PRESSURE: 152 MMHG | DIASTOLIC BLOOD PRESSURE: 77 MMHG | HEART RATE: 67 BPM | OXYGEN SATURATION: 98 % | BODY MASS INDEX: 38.45 KG/M2 | WEIGHT: 217 LBS | RESPIRATION RATE: 20 BRPM

## 2023-10-26 DIAGNOSIS — L81.9 HYPOPIGMENTATION: ICD-10-CM

## 2023-10-26 DIAGNOSIS — Z01.818 PRE-OP EVALUATION: Primary | ICD-10-CM

## 2023-10-26 DIAGNOSIS — I10 ESSENTIAL HYPERTENSION, BENIGN: ICD-10-CM

## 2023-10-26 DIAGNOSIS — E11.9 TYPE 2 DIABETES MELLITUS WITHOUT COMPLICATION, WITHOUT LONG-TERM CURRENT USE OF INSULIN (HCC): ICD-10-CM

## 2023-10-26 PROCEDURE — 93005 ELECTROCARDIOGRAM TRACING: CPT | Performed by: FAMILY MEDICINE

## 2023-10-26 PROCEDURE — 99214 OFFICE O/P EST MOD 30 MIN: CPT | Performed by: FAMILY MEDICINE

## 2023-10-26 NOTE — PROGRESS NOTES
1. \"Have you been to the ER, urgent care clinic since your last visit? Hospitalized since your last visit? \" Yes [facility] for congestion.(10/16/2023). 2. \"Have you seen or consulted any other health care providers outside of the [address] [place] [address] since your last visit? \" No     3. For patients aged 43-73: Has the patient had a colonoscopy / FIT/ Cologuard? NA - based on age      If the patient is female:    4. For patients aged 43-66: Has the patient had a mammogram within the past 2 years? NA - based on age or sex      11. For patients aged 21-65: Has the patient had a pap smear?  NA - based on age or sex

## 2023-10-26 NOTE — PROGRESS NOTES
SUBJECTIVE:   Ms. Thierry Lynn is a 74 y.o. female who is here for a Pre-op Exam.     Pre-Op: Pt is scheduled for lipoma removal on 11/06/2023 with Dr. Va Amado. HTN: BP today is 152/77. She believes BP is elevated because she was in a rush to come in to the office today. She is currently taking olmesartan-HCTZ 20-12.5 mg 1 tablet daily. DM2: Pt's last HbA1c was 6.5% on 06/15/2023. She is diet controlled diabetic. Hypopigmentation: Pt has concerns of discoloration/hypopigmentation in her left hand. She mentions that it appeared about a month ago. She states she had a cortisone shot in the same area of her hand. She's worried that might be cancer. Pt had bronchitis and went to [facility]. She states she still has some chest congestion and cough. She mentions that it has calmed down but she still has some mucus. She has been taking Mucinex to clear out the mucus. Her cough has reduced and she states it only comes and goes. She denies wheezing and CP. She was finished two courses of antibiotics for the bronchitis. At this time, she is otherwise doing well and has brought no other complaints to my attention today. For a list of the medical issues addressed today, see the assessment and plan below. PMH:   Past Medical History:   Diagnosis Date    Diabetes ([address])     DJD (degenerative joint disease)     NORDT    GERD (gastroesophageal reflux disease)     Hyperlipidemia     Hypertension     Obesity     Positive tuberculin test 1968    Tx INH    PUD (peptic ulcer disease)      PSH:  has a past surgical history that includes Hysterectomy, total abdominal (1994); Colonoscopy (N/A, 1/17/2023); Cataract removal (Bilateral); Breast biopsy (Left, 1988); repair rotator cuff,chronic (02/2004); Carpal tunnel release (12/19/2017); Carpal tunnel release (Right, 12/2019); mammo diag bilat (04/21/2008); Colonoscopy (10/02/2000); orthopedic surgery (01/2004);  Colonoscopy (N/A, 08/23/2016); colorectal

## 2023-10-27 LAB
ALBUMIN SERPL-MCNC: 3.8 G/DL (ref 3.5–5)
ALBUMIN/GLOB SERPL: 1.1 (ref 1.1–2.2)
ALP SERPL-CCNC: 118 U/L (ref 45–117)
ALT SERPL-CCNC: 42 U/L (ref 12–78)
ANION GAP SERPL CALC-SCNC: 3 MMOL/L (ref 5–15)
AST SERPL-CCNC: 23 U/L (ref 15–37)
BASOPHILS # BLD: 0.1 K/UL (ref 0–0.1)
BASOPHILS NFR BLD: 1 % (ref 0–1)
BILIRUB SERPL-MCNC: 0.5 MG/DL (ref 0.2–1)
BUN SERPL-MCNC: 11 MG/DL (ref 6–20)
BUN/CREAT SERPL: 16 (ref 12–20)
CALCIUM SERPL-MCNC: 9.3 MG/DL (ref 8.5–10.1)
CHLORIDE SERPL-SCNC: 103 MMOL/L (ref 97–108)
CO2 SERPL-SCNC: 30 MMOL/L (ref 21–32)
CREAT SERPL-MCNC: 0.69 MG/DL (ref 0.55–1.02)
DIFFERENTIAL METHOD BLD: ABNORMAL
EOSINOPHIL # BLD: 0.2 K/UL (ref 0–0.4)
EOSINOPHIL NFR BLD: 3 % (ref 0–7)
ERYTHROCYTE [DISTWIDTH] IN BLOOD BY AUTOMATED COUNT: 14.1 % (ref 11.5–14.5)
GLOBULIN SER CALC-MCNC: 3.4 G/DL (ref 2–4)
GLUCOSE SERPL-MCNC: 127 MG/DL (ref 65–100)
HCT VFR BLD AUTO: 35.1 % (ref 35–47)
HGB BLD-MCNC: 11.1 G/DL (ref 11.5–16)
IMM GRANULOCYTES # BLD AUTO: 0 K/UL (ref 0–0.04)
IMM GRANULOCYTES NFR BLD AUTO: 0 % (ref 0–0.5)
LYMPHOCYTES # BLD: 1.9 K/UL (ref 0.8–3.5)
LYMPHOCYTES NFR BLD: 33 % (ref 12–49)
MCH RBC QN AUTO: 28.3 PG (ref 26–34)
MCHC RBC AUTO-ENTMCNC: 31.6 G/DL (ref 30–36.5)
MCV RBC AUTO: 89.5 FL (ref 80–99)
MONOCYTES # BLD: 0.6 K/UL (ref 0–1)
MONOCYTES NFR BLD: 10 % (ref 5–13)
NEUTS SEG # BLD: 3.1 K/UL (ref 1.8–8)
NEUTS SEG NFR BLD: 53 % (ref 32–75)
NRBC # BLD: 0 K/UL (ref 0–0.01)
NRBC BLD-RTO: 0 PER 100 WBC
PLATELET # BLD AUTO: 236 K/UL (ref 150–400)
PMV BLD AUTO: 11.5 FL (ref 8.9–12.9)
POTASSIUM SERPL-SCNC: 4 MMOL/L (ref 3.5–5.1)
PROT SERPL-MCNC: 7.2 G/DL (ref 6.4–8.2)
RBC # BLD AUTO: 3.92 M/UL (ref 3.8–5.2)
SODIUM SERPL-SCNC: 136 MMOL/L (ref 136–145)
WBC # BLD AUTO: 5.7 K/UL (ref 3.6–11)

## 2023-10-28 LAB
EST. AVERAGE GLUCOSE BLD GHB EST-MCNC: 140 MG/DL
HBA1C MFR BLD: 6.5 % (ref 4–5.6)

## 2024-01-22 ENCOUNTER — PATIENT MESSAGE (OUTPATIENT)
Age: 74
End: 2024-01-22

## 2024-01-22 DIAGNOSIS — E11.9 TYPE 2 DIABETES MELLITUS WITHOUT COMPLICATION, WITHOUT LONG-TERM CURRENT USE OF INSULIN (HCC): Primary | ICD-10-CM

## 2024-01-23 NOTE — TELEPHONE ENCOUNTER
From: Ivanna Guzman  To: Dr. Trisha Navarro  Sent: 1/22/2024 11:35 PM EST  Subject: PRESCRIPTION REFILL    Would like a refill for OneTouch Ultra Blue Test Strips    Former RX prescription #[id number]  [address] and [facility]

## 2024-06-19 ENCOUNTER — OFFICE VISIT (OUTPATIENT)
Age: 74
End: 2024-06-19
Payer: MEDICARE

## 2024-06-19 VITALS
SYSTOLIC BLOOD PRESSURE: 147 MMHG | OXYGEN SATURATION: 96 % | HEIGHT: 63 IN | DIASTOLIC BLOOD PRESSURE: 84 MMHG | WEIGHT: 218.2 LBS | HEART RATE: 79 BPM | BODY MASS INDEX: 38.66 KG/M2 | TEMPERATURE: 97.8 F | RESPIRATION RATE: 18 BRPM

## 2024-06-19 DIAGNOSIS — Z12.31 ENCOUNTER FOR SCREENING MAMMOGRAM FOR MALIGNANT NEOPLASM OF BREAST: ICD-10-CM

## 2024-06-19 DIAGNOSIS — E11.9 TYPE 2 DIABETES MELLITUS WITHOUT COMPLICATION, WITHOUT LONG-TERM CURRENT USE OF INSULIN (HCC): ICD-10-CM

## 2024-06-19 DIAGNOSIS — E66.01 SEVERE OBESITY (BMI 35.0-39.9) WITH COMORBIDITY (HCC): ICD-10-CM

## 2024-06-19 DIAGNOSIS — I10 ESSENTIAL HYPERTENSION, BENIGN: Primary | ICD-10-CM

## 2024-06-19 LAB
ALBUMIN SERPL-MCNC: 3.8 G/DL (ref 3.5–5)
ALBUMIN/GLOB SERPL: 1.1 (ref 1.1–2.2)
ALP SERPL-CCNC: 100 U/L (ref 45–117)
ALT SERPL-CCNC: 27 U/L (ref 12–78)
ANION GAP SERPL CALC-SCNC: 5 MMOL/L (ref 5–15)
AST SERPL-CCNC: 15 U/L (ref 15–37)
BASOPHILS # BLD: 0.1 K/UL (ref 0–0.1)
BASOPHILS NFR BLD: 1 % (ref 0–1)
BILIRUB SERPL-MCNC: 0.5 MG/DL (ref 0.2–1)
BUN SERPL-MCNC: 16 MG/DL (ref 6–20)
BUN/CREAT SERPL: 22 (ref 12–20)
CALCIUM SERPL-MCNC: 9.4 MG/DL (ref 8.5–10.1)
CHLORIDE SERPL-SCNC: 105 MMOL/L (ref 97–108)
CO2 SERPL-SCNC: 28 MMOL/L (ref 21–32)
CREAT SERPL-MCNC: 0.72 MG/DL (ref 0.55–1.02)
DIFFERENTIAL METHOD BLD: ABNORMAL
EOSINOPHIL # BLD: 0.1 K/UL (ref 0–0.4)
EOSINOPHIL NFR BLD: 2 % (ref 0–7)
ERYTHROCYTE [DISTWIDTH] IN BLOOD BY AUTOMATED COUNT: 14.6 % (ref 11.5–14.5)
EST. AVERAGE GLUCOSE BLD GHB EST-MCNC: 143 MG/DL
GLOBULIN SER CALC-MCNC: 3.4 G/DL (ref 2–4)
GLUCOSE SERPL-MCNC: 140 MG/DL (ref 65–100)
HBA1C MFR BLD: 6.6 % (ref 4–5.6)
HCT VFR BLD AUTO: 37.4 % (ref 35–47)
HGB BLD-MCNC: 11.6 G/DL (ref 11.5–16)
IMM GRANULOCYTES # BLD AUTO: 0 K/UL (ref 0–0.04)
IMM GRANULOCYTES NFR BLD AUTO: 0 % (ref 0–0.5)
LYMPHOCYTES # BLD: 1.8 K/UL (ref 0.8–3.5)
LYMPHOCYTES NFR BLD: 37 % (ref 12–49)
MCH RBC QN AUTO: 27 PG (ref 26–34)
MCHC RBC AUTO-ENTMCNC: 31 G/DL (ref 30–36.5)
MCV RBC AUTO: 87.2 FL (ref 80–99)
MONOCYTES # BLD: 0.6 K/UL (ref 0–1)
MONOCYTES NFR BLD: 12 % (ref 5–13)
NEUTS SEG # BLD: 2.4 K/UL (ref 1.8–8)
NEUTS SEG NFR BLD: 48 % (ref 32–75)
NRBC # BLD: 0 K/UL (ref 0–0.01)
NRBC BLD-RTO: 0 PER 100 WBC
PLATELET # BLD AUTO: 230 K/UL (ref 150–400)
PMV BLD AUTO: 11.4 FL (ref 8.9–12.9)
POTASSIUM SERPL-SCNC: 4.4 MMOL/L (ref 3.5–5.1)
PROT SERPL-MCNC: 7.2 G/DL (ref 6.4–8.2)
RBC # BLD AUTO: 4.29 M/UL (ref 3.8–5.2)
SODIUM SERPL-SCNC: 138 MMOL/L (ref 136–145)
WBC # BLD AUTO: 5 K/UL (ref 3.6–11)

## 2024-06-19 PROCEDURE — 1090F PRES/ABSN URINE INCON ASSESS: CPT | Performed by: FAMILY MEDICINE

## 2024-06-19 PROCEDURE — 99214 OFFICE O/P EST MOD 30 MIN: CPT | Performed by: FAMILY MEDICINE

## 2024-06-19 PROCEDURE — 1036F TOBACCO NON-USER: CPT | Performed by: FAMILY MEDICINE

## 2024-06-19 PROCEDURE — G8427 DOCREV CUR MEDS BY ELIG CLIN: HCPCS | Performed by: FAMILY MEDICINE

## 2024-06-19 PROCEDURE — G8417 CALC BMI ABV UP PARAM F/U: HCPCS | Performed by: FAMILY MEDICINE

## 2024-06-19 PROCEDURE — G8399 PT W/DXA RESULTS DOCUMENT: HCPCS | Performed by: FAMILY MEDICINE

## 2024-06-19 PROCEDURE — 1123F ACP DISCUSS/DSCN MKR DOCD: CPT | Performed by: FAMILY MEDICINE

## 2024-06-19 PROCEDURE — 3077F SYST BP >= 140 MM HG: CPT | Performed by: FAMILY MEDICINE

## 2024-06-19 PROCEDURE — 3044F HG A1C LEVEL LT 7.0%: CPT | Performed by: FAMILY MEDICINE

## 2024-06-19 PROCEDURE — 3079F DIAST BP 80-89 MM HG: CPT | Performed by: FAMILY MEDICINE

## 2024-06-19 RX ORDER — CHLORAL HYDRATE 500 MG
1 CAPSULE ORAL
COMMUNITY

## 2024-06-19 ASSESSMENT — PATIENT HEALTH QUESTIONNAIRE - PHQ9
SUM OF ALL RESPONSES TO PHQ QUESTIONS 1-9: 0
SUM OF ALL RESPONSES TO PHQ9 QUESTIONS 1 & 2: 0
2. FEELING DOWN, DEPRESSED OR HOPELESS: NOT AT ALL
SUM OF ALL RESPONSES TO PHQ QUESTIONS 1-9: 0
1. LITTLE INTEREST OR PLEASURE IN DOING THINGS: NOT AT ALL
SUM OF ALL RESPONSES TO PHQ QUESTIONS 1-9: 0
SUM OF ALL RESPONSES TO PHQ QUESTIONS 1-9: 0

## 2024-06-19 NOTE — PROGRESS NOTES
SUBJECTIVE:   Ms. Ivanna CAIN Minor is a 74 y.o. female who is here for follow up of routine medical issues.      HTN: Patient is complaint in taking olmesartan-HCTZ 20-12.5 mg daily. Their BP today was 147/84. Pt explains her BP fluctuates at home 120, 155, and 135.  DM2:  Their last HbA1c was 6.5 on 10/26/2023.    Patient explains she sleeps well when she does get sleep. She explains she is a night owl.  Patient explains she's been walking she walks the neighborhood and walking around the track she has been going up and down the stairs. She is watching what she eats cutting back on  simple carb's     Pt specifically denies changes in vision or hearing, trouble with swallowing or taste, CP, SOB, heartburn or upset stomach, change in bowel habits, unusual joint or muscle pains, numbness or tingling in extremities, or skin lesions of concern.      At this time, she is otherwise doing well and has brought no other complaints to my attention today.  For a list of the medical issues addressed today, see the assessment and plan below.    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    Tx INH    PUD (peptic ulcer disease)      PSH:  has a past surgical history that includes Hysterectomy, total abdominal (1994); Colonoscopy (N/A, 1/17/2023); Cataract removal (Bilateral); Breast biopsy (Left, 1988); repair rotator cuff,chronic (02/2004); Carpal tunnel release (12/19/2017); Carpal tunnel release (Right, 12/2019); mammo diag bilat (04/21/2008); Colonoscopy (10/02/2000); orthopedic surgery (01/2004); Colonoscopy (N/A, 08/23/2016); colorectal scrn; hi risk ind (08/23/2016); Total abdominal hysterectomy w/ bilateral salpingoophorectomy; Shoulder arthroscopy; Knee arthroscopy (Right, 1999); and lipoma resection.    All: is allergic to

## 2024-06-19 NOTE — PROGRESS NOTES
\"Have you been to the ER, urgent care clinic since your last visit?  Hospitalized since your last visit?\"    NO    “Have you seen or consulted any other health care providers outside of [facility] since your last visit?”    NO            Click Here for Release of Records Request

## 2024-07-29 ENCOUNTER — HOSPITAL ENCOUNTER (OUTPATIENT)
Facility: HOSPITAL | Age: 74
Discharge: HOME | End: 2024-08-01
Attending: FAMILY MEDICINE
Payer: MEDICARE

## 2024-07-29 DIAGNOSIS — Z12.31 ENCOUNTER FOR SCREENING MAMMOGRAM FOR MALIGNANT NEOPLASM OF BREAST: ICD-10-CM

## 2024-07-29 PROCEDURE — 77063 BREAST TOMOSYNTHESIS BI: CPT

## 2024-12-27 SDOH — HEALTH STABILITY: PHYSICAL HEALTH: ON AVERAGE, HOW MANY DAYS PER WEEK DO YOU ENGAGE IN MODERATE TO STRENUOUS EXERCISE (LIKE A BRISK WALK)?: 2 DAYS

## 2024-12-27 SDOH — HEALTH STABILITY: PHYSICAL HEALTH: ON AVERAGE, HOW MANY MINUTES DO YOU ENGAGE IN EXERCISE AT THIS LEVEL?: 50 MIN

## 2024-12-27 SDOH — ECONOMIC STABILITY: FOOD INSECURITY: WITHIN THE PAST 12 MONTHS, YOU WORRIED THAT YOUR FOOD WOULD RUN OUT BEFORE YOU GOT MONEY TO BUY MORE.: NEVER TRUE

## 2024-12-27 SDOH — ECONOMIC STABILITY: FOOD INSECURITY: WITHIN THE PAST 12 MONTHS, THE FOOD YOU BOUGHT JUST DIDN'T LAST AND YOU DIDN'T HAVE MONEY TO GET MORE.: NEVER TRUE

## 2024-12-27 SDOH — ECONOMIC STABILITY: INCOME INSECURITY: HOW HARD IS IT FOR YOU TO PAY FOR THE VERY BASICS LIKE FOOD, HOUSING, MEDICAL CARE, AND HEATING?: NOT HARD AT ALL

## 2024-12-27 SDOH — ECONOMIC STABILITY: TRANSPORTATION INSECURITY
IN THE PAST 12 MONTHS, HAS LACK OF TRANSPORTATION KEPT YOU FROM MEETINGS, WORK, OR FROM GETTING THINGS NEEDED FOR DAILY LIVING?: NO

## 2024-12-27 ASSESSMENT — PATIENT HEALTH QUESTIONNAIRE - PHQ9
SUM OF ALL RESPONSES TO PHQ QUESTIONS 1-9: 0
SUM OF ALL RESPONSES TO PHQ QUESTIONS 1-9: 0
2. FEELING DOWN, DEPRESSED OR HOPELESS: NOT AT ALL
SUM OF ALL RESPONSES TO PHQ9 QUESTIONS 1 & 2: 0
1. LITTLE INTEREST OR PLEASURE IN DOING THINGS: NOT AT ALL
SUM OF ALL RESPONSES TO PHQ QUESTIONS 1-9: 0
SUM OF ALL RESPONSES TO PHQ QUESTIONS 1-9: 0

## 2024-12-27 ASSESSMENT — LIFESTYLE VARIABLES
HOW OFTEN DO YOU HAVE SIX OR MORE DRINKS ON ONE OCCASION: 1
HOW OFTEN DO YOU HAVE A DRINK CONTAINING ALCOHOL: NEVER
HOW MANY STANDARD DRINKS CONTAINING ALCOHOL DO YOU HAVE ON A TYPICAL DAY: 0
HOW MANY STANDARD DRINKS CONTAINING ALCOHOL DO YOU HAVE ON A TYPICAL DAY: PATIENT DOES NOT DRINK
HOW OFTEN DO YOU HAVE A DRINK CONTAINING ALCOHOL: 1

## 2024-12-30 ENCOUNTER — OFFICE VISIT (OUTPATIENT)
Age: 74
End: 2024-12-30
Payer: MEDICARE

## 2024-12-30 VITALS
HEART RATE: 76 BPM | DIASTOLIC BLOOD PRESSURE: 98 MMHG | BODY MASS INDEX: 38.48 KG/M2 | OXYGEN SATURATION: 100 % | WEIGHT: 217.2 LBS | RESPIRATION RATE: 18 BRPM | SYSTOLIC BLOOD PRESSURE: 150 MMHG | HEIGHT: 63 IN | TEMPERATURE: 97.6 F

## 2024-12-30 DIAGNOSIS — Z00.00 MEDICARE ANNUAL WELLNESS VISIT, SUBSEQUENT: Primary | ICD-10-CM

## 2024-12-30 DIAGNOSIS — E66.01 SEVERE OBESITY (BMI 35.0-39.9) WITH COMORBIDITY: ICD-10-CM

## 2024-12-30 DIAGNOSIS — L70.0 BLACK HEAD: ICD-10-CM

## 2024-12-30 DIAGNOSIS — E11.9 TYPE 2 DIABETES MELLITUS WITHOUT COMPLICATION, WITHOUT LONG-TERM CURRENT USE OF INSULIN (HCC): ICD-10-CM

## 2024-12-30 DIAGNOSIS — I10 ESSENTIAL HYPERTENSION, BENIGN: ICD-10-CM

## 2024-12-30 DIAGNOSIS — E78.2 MIXED HYPERLIPIDEMIA: ICD-10-CM

## 2024-12-30 PROCEDURE — G0439 PPPS, SUBSEQ VISIT: HCPCS | Performed by: FAMILY MEDICINE

## 2024-12-30 PROCEDURE — 1123F ACP DISCUSS/DSCN MKR DOCD: CPT | Performed by: FAMILY MEDICINE

## 2024-12-30 PROCEDURE — 3044F HG A1C LEVEL LT 7.0%: CPT | Performed by: FAMILY MEDICINE

## 2024-12-30 PROCEDURE — 1159F MED LIST DOCD IN RCRD: CPT | Performed by: FAMILY MEDICINE

## 2024-12-30 PROCEDURE — 3080F DIAST BP >= 90 MM HG: CPT | Performed by: FAMILY MEDICINE

## 2024-12-30 PROCEDURE — G8484 FLU IMMUNIZE NO ADMIN: HCPCS | Performed by: FAMILY MEDICINE

## 2024-12-30 PROCEDURE — 3077F SYST BP >= 140 MM HG: CPT | Performed by: FAMILY MEDICINE

## 2024-12-30 RX ORDER — BIOTIN 10000 MCG
CAPSULE ORAL
COMMUNITY

## 2024-12-30 RX ORDER — DOXYCYCLINE HYCLATE 100 MG
100 TABLET ORAL 2 TIMES DAILY
Qty: 10 TABLET | Refills: 0 | Status: SHIPPED | OUTPATIENT
Start: 2024-12-30 | End: 2025-01-04

## 2024-12-30 NOTE — PATIENT INSTRUCTIONS
Learning About Being Active as an Older Adult  Why is being active important as you get older?     Being active is one of the best things you can do for your health. And it's never too late to start. Being active--or getting active, if you aren't already--has definite benefits. It can:  Give you more energy,  Keep your mind sharp.  Improve balance to reduce your risk of falls.  Help you manage chronic illness with fewer medicines.  No matter how old you are, how fit you are, or what health problems you have, there is a form of activity that will work for you. And the more physical activity you can do, the better your overall health will be.  What kinds of activity can help you stay healthy?  Being more active will make your daily activities easier. Physical activity includes planned exercise and things you do in daily life. There are four types of activity:  Aerobic.  Doing aerobic activity makes your heart and lungs strong.  Includes walking, dancing, and gardening.  Aim for at least 2½ hours spread throughout the week.  It improves your energy and can help you sleep better.  Muscle-strengthening.  This type of activity can help maintain muscle and strengthen bones.  Includes climbing stairs, using resistance bands, and lifting or carrying heavy loads.  Aim for at least twice a week.  It can help protect the knees and other joints.  Stretching.  Stretching gives you better range of motion in joints and muscles.  Includes upper arm stretches, calf stretches, and gentle yoga.  Aim for at least twice a week, preferably after your muscles are warmed up from other activities.  It can help you function better in daily life.  Balancing.  This helps you stay coordinated and have good posture.  Includes heel-to-toe walking, jamila chi, and certain types of yoga.  Aim for at least 3 days a week.  It can reduce your risk of falling.  Even if you have a hard time meeting the recommendations, it's better to be more active

## 2024-12-30 NOTE — PROGRESS NOTES
SUBJECTIVE:   Ms. Ivanna CAIN Minor is a 74 y.o. female who is here for [facility].      HTN: Patient is compliant in taking olmesartan-HCTZ 20-12.5 mg daily . Her BP today was 150/98. Pt states her BP fluctuates between 120-140 at home she admits her bottom number is never usually that high. Pt states she take her BP medication every other day due to her having body aches.    Pt states she usually gets a about 6 hours of sleep a night.    Pt states she has began an exercise class.      Pt mentions she is having L. Ear pain for about a month. She states she thought it was a blackhead and she admits she got something out of it but it continues to come back. Pt has tried gentamicin cream, but pt admits it was old.    Pt mentions that she has a tingling sensation in her right thigh.    PREVENTATIVE:  Flu declined at this time  COVID  UTD  Mammogram UTD  DEXA UTD    At this time, she is otherwise doing well and has brought no other complaints to my attention today.  For a list of the medical issues addressed today, see the assessment and plan below.    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    Tx INH    PUD (peptic ulcer disease)      PSH:  has a past surgical history that includes Hysterectomy, total abdominal (1994); Colonoscopy (N/A, 1/17/2023); Cataract removal (Bilateral); Breast biopsy (Left, 1988); repair rotator cuff,chronic (02/2004); Carpal tunnel release (12/19/2017); Carpal tunnel release (Right, 12/2019); mammo diag bilat (04/21/2008); Colonoscopy (10/02/2000); orthopedic surgery (01/2004); Colonoscopy (N/A, 08/23/2016); colorectal scrn; hi risk ind (08/23/2016); Total abdominal hysterectomy w/ bilateral salpingoophorectomy; Shoulder arthroscopy; Knee arthroscopy (Right, 1999); and lipoma resection.    All: is allergic to

## 2024-12-31 LAB
ALBUMIN SERPL-MCNC: 3.9 G/DL (ref 3.5–5)
ALBUMIN/GLOB SERPL: 1.2 (ref 1.1–2.2)
ALP SERPL-CCNC: 123 U/L (ref 45–117)
ALT SERPL-CCNC: 36 U/L (ref 12–78)
ANION GAP SERPL CALC-SCNC: 6 MMOL/L (ref 2–12)
AST SERPL-CCNC: 16 U/L (ref 15–37)
BASOPHILS # BLD: 0 K/UL (ref 0–0.1)
BASOPHILS NFR BLD: 1 % (ref 0–1)
BILIRUB SERPL-MCNC: 0.5 MG/DL (ref 0.2–1)
BUN SERPL-MCNC: 13 MG/DL (ref 6–20)
BUN/CREAT SERPL: 20 (ref 12–20)
CALCIUM SERPL-MCNC: 9.7 MG/DL (ref 8.5–10.1)
CHLORIDE SERPL-SCNC: 104 MMOL/L (ref 97–108)
CHOLEST SERPL-MCNC: 241 MG/DL
CO2 SERPL-SCNC: 29 MMOL/L (ref 21–32)
CREAT SERPL-MCNC: 0.65 MG/DL (ref 0.55–1.02)
DIFFERENTIAL METHOD BLD: NORMAL
EOSINOPHIL # BLD: 0.1 K/UL (ref 0–0.4)
EOSINOPHIL NFR BLD: 2 % (ref 0–7)
ERYTHROCYTE [DISTWIDTH] IN BLOOD BY AUTOMATED COUNT: 14.1 % (ref 11.5–14.5)
EST. AVERAGE GLUCOSE BLD GHB EST-MCNC: 154 MG/DL
GLOBULIN SER CALC-MCNC: 3.2 G/DL (ref 2–4)
GLUCOSE SERPL-MCNC: 111 MG/DL (ref 65–100)
HBA1C MFR BLD: 7 % (ref 4–5.6)
HCT VFR BLD AUTO: 37.6 % (ref 35–47)
HDLC SERPL-MCNC: 78 MG/DL
HDLC SERPL: 3.1 (ref 0–5)
HGB BLD-MCNC: 11.7 G/DL (ref 11.5–16)
IMM GRANULOCYTES # BLD AUTO: 0 K/UL (ref 0–0.04)
IMM GRANULOCYTES NFR BLD AUTO: 0 % (ref 0–0.5)
LDLC SERPL CALC-MCNC: 151.6 MG/DL (ref 0–100)
LYMPHOCYTES # BLD: 1.8 K/UL (ref 0.8–3.5)
LYMPHOCYTES NFR BLD: 37 % (ref 12–49)
MCH RBC QN AUTO: 27 PG (ref 26–34)
MCHC RBC AUTO-ENTMCNC: 31.1 G/DL (ref 30–36.5)
MCV RBC AUTO: 86.8 FL (ref 80–99)
MONOCYTES # BLD: 0.5 K/UL (ref 0–1)
MONOCYTES NFR BLD: 11 % (ref 5–13)
NEUTS SEG # BLD: 2.4 K/UL (ref 1.8–8)
NEUTS SEG NFR BLD: 49 % (ref 32–75)
NRBC # BLD: 0 K/UL (ref 0–0.01)
NRBC BLD-RTO: 0 PER 100 WBC
PLATELET # BLD AUTO: 233 K/UL (ref 150–400)
PMV BLD AUTO: 11.6 FL (ref 8.9–12.9)
POTASSIUM SERPL-SCNC: 4.4 MMOL/L (ref 3.5–5.1)
PROT SERPL-MCNC: 7.1 G/DL (ref 6.4–8.2)
RBC # BLD AUTO: 4.33 M/UL (ref 3.8–5.2)
SODIUM SERPL-SCNC: 139 MMOL/L (ref 136–145)
TRIGL SERPL-MCNC: 57 MG/DL
VLDLC SERPL CALC-MCNC: 11.4 MG/DL
WBC # BLD AUTO: 4.9 K/UL (ref 3.6–11)

## 2025-03-21 DIAGNOSIS — E11.9 TYPE 2 DIABETES MELLITUS WITHOUT COMPLICATION, WITHOUT LONG-TERM CURRENT USE OF INSULIN (HCC): ICD-10-CM

## 2025-03-21 RX ORDER — FENOFIBRATE 48 MG/1
48 TABLET, COATED ORAL DAILY
Qty: 90 TABLET | Refills: 2 | Status: SHIPPED | OUTPATIENT
Start: 2025-03-21

## 2025-03-21 RX ORDER — OLMESARTAN MEDOXOMIL AND HYDROCHLOROTHIAZIDE 20/12.5 20; 12.5 MG/1; MG/1
1 TABLET ORAL DAILY
Qty: 90 TABLET | Refills: 3 | Status: SHIPPED | OUTPATIENT
Start: 2025-03-21

## 2025-03-25 DIAGNOSIS — E11.9 TYPE 2 DIABETES MELLITUS WITHOUT COMPLICATION, WITHOUT LONG-TERM CURRENT USE OF INSULIN: ICD-10-CM

## 2025-03-25 NOTE — TELEPHONE ENCOUNTER
PCP: Trisha Navarro MD    Last appt: [unfilled]  Future Appointments   Date Time Provider Department Center   2025  1:20 PM Trisha Navarro MD [facility] E[facility] DEP   2025  1:20 PM Trisha Navarro MD [facility] DEP       Requested Prescriptions     Pending Prescriptions Disp Refills    blood glucose test strips (ASCENSIA AUTODISC VI;ONE TOUCH ULTRA TEST VI) strip 100 each 3     Si each by In Vitro route daily As needed.       Prior labs and Blood pressures:  BP Readings from Last 3 Encounters:   24 (!) 150/98   24 (!) 147/84   10/26/23 (!) 152/77     Lab Results   Component Value Date/Time     2024 02:20 PM    K 4.4 2024 02:20 PM     2024 02:20 PM    CO2 29 2024 02:20 PM    BUN 13 2024 02:20 PM    GFRAA 100 09/15/2021 01:44 PM     No results found for: \"HBA1C\", \"SEJ3ONHT\"  Lab Results   Component Value Date/Time    CHOL 241 2024 02:20 PM    HDL 78 2024 02:20 PM    .6 2024 02:20 PM    .4 06/15/2023 01:32 PM    VLDL 11.4 2024 02:20 PM    VLDL 13 2023 02:06 PM     No results found for: \"VITD3\"    No results found for: \"TSH\", \"TSH2\", \"TSH3\"

## 2025-03-27 RX ORDER — BLOOD SUGAR DIAGNOSTIC
STRIP MISCELLANEOUS
Qty: 100 STRIP | OUTPATIENT
Start: 2025-03-27

## 2025-04-21 NOTE — TELEPHONE ENCOUNTER
Call received from patient, two identifiers verified. Patient reports receiving mammograms from [facility]. Patient is scheduled for 04/10/2018. Medication:   Requested Prescriptions     Pending Prescriptions Disp Refills    busPIRone (BUSPAR) 10 MG tablet [Pharmacy Med Name: BUSPIRONE HCL 10 MG TABLET] 135 tablet 1     Sig: TAKE 1/2 TABLET BY MOUTH 3 TIMES DAILY     Last Filled:  3/28/25    Last appt: 4/30/2024   Next appt: Visit date not found    Last OARRS:        No data to display

## 2025-06-22 SDOH — ECONOMIC STABILITY: TRANSPORTATION INSECURITY
IN THE PAST 12 MONTHS, HAS THE LACK OF TRANSPORTATION KEPT YOU FROM MEDICAL APPOINTMENTS OR FROM GETTING MEDICATIONS?: NO

## 2025-06-22 SDOH — ECONOMIC STABILITY: INCOME INSECURITY: IN THE LAST 12 MONTHS, WAS THERE A TIME WHEN YOU WERE NOT ABLE TO PAY THE MORTGAGE OR RENT ON TIME?: NO

## 2025-06-22 SDOH — ECONOMIC STABILITY: FOOD INSECURITY: WITHIN THE PAST 12 MONTHS, YOU WORRIED THAT YOUR FOOD WOULD RUN OUT BEFORE YOU GOT MONEY TO BUY MORE.: NEVER TRUE

## 2025-06-22 SDOH — ECONOMIC STABILITY: FOOD INSECURITY: WITHIN THE PAST 12 MONTHS, THE FOOD YOU BOUGHT JUST DIDN'T LAST AND YOU DIDN'T HAVE MONEY TO GET MORE.: NEVER TRUE

## 2025-06-25 ENCOUNTER — OFFICE VISIT (OUTPATIENT)
Age: 75
End: 2025-06-25
Payer: MEDICARE

## 2025-06-25 VITALS
WEIGHT: 214.8 LBS | HEIGHT: 63 IN | HEART RATE: 54 BPM | BODY MASS INDEX: 38.06 KG/M2 | RESPIRATION RATE: 18 BRPM | DIASTOLIC BLOOD PRESSURE: 80 MMHG | SYSTOLIC BLOOD PRESSURE: 118 MMHG | OXYGEN SATURATION: 97 % | TEMPERATURE: 97.7 F

## 2025-06-25 DIAGNOSIS — E11.9 TYPE 2 DIABETES MELLITUS WITHOUT COMPLICATION, WITHOUT LONG-TERM CURRENT USE OF INSULIN (HCC): ICD-10-CM

## 2025-06-25 DIAGNOSIS — E66.01 MORBID (SEVERE) OBESITY DUE TO EXCESS CALORIES (HCC): ICD-10-CM

## 2025-06-25 DIAGNOSIS — Z13.820 OSTEOPOROSIS SCREENING: ICD-10-CM

## 2025-06-25 DIAGNOSIS — Z78.0 POST-MENOPAUSAL: ICD-10-CM

## 2025-06-25 DIAGNOSIS — E11.9 TYPE 2 DIABETES MELLITUS WITHOUT COMPLICATION, WITHOUT LONG-TERM CURRENT USE OF INSULIN (HCC): Primary | ICD-10-CM

## 2025-06-25 PROCEDURE — 1123F ACP DISCUSS/DSCN MKR DOCD: CPT | Performed by: FAMILY MEDICINE

## 2025-06-25 PROCEDURE — 99214 OFFICE O/P EST MOD 30 MIN: CPT | Performed by: FAMILY MEDICINE

## 2025-06-25 PROCEDURE — 3074F SYST BP LT 130 MM HG: CPT | Performed by: FAMILY MEDICINE

## 2025-06-25 PROCEDURE — 1159F MED LIST DOCD IN RCRD: CPT | Performed by: FAMILY MEDICINE

## 2025-06-25 PROCEDURE — 3078F DIAST BP <80 MM HG: CPT | Performed by: FAMILY MEDICINE

## 2025-06-25 PROCEDURE — G8417 CALC BMI ABV UP PARAM F/U: HCPCS | Performed by: FAMILY MEDICINE

## 2025-06-25 PROCEDURE — G8427 DOCREV CUR MEDS BY ELIG CLIN: HCPCS | Performed by: FAMILY MEDICINE

## 2025-06-25 PROCEDURE — G8399 PT W/DXA RESULTS DOCUMENT: HCPCS | Performed by: FAMILY MEDICINE

## 2025-06-25 PROCEDURE — 1090F PRES/ABSN URINE INCON ASSESS: CPT | Performed by: FAMILY MEDICINE

## 2025-06-25 PROCEDURE — 1036F TOBACCO NON-USER: CPT | Performed by: FAMILY MEDICINE

## 2025-06-25 ASSESSMENT — PATIENT HEALTH QUESTIONNAIRE - PHQ9
SUM OF ALL RESPONSES TO PHQ QUESTIONS 1-9: 0
1. LITTLE INTEREST OR PLEASURE IN DOING THINGS: NOT AT ALL
SUM OF ALL RESPONSES TO PHQ QUESTIONS 1-9: 0
2. FEELING DOWN, DEPRESSED OR HOPELESS: NOT AT ALL

## 2025-06-25 NOTE — PROGRESS NOTES
SUBJECTIVE:   Ms. Ivanna CAIN Minor is a 75 y.o. female who is here for follow up of routine medical issues.      Patient presents today for 6 month follow up.        Pt got steroid shot in L shoulder and R hip with orthopedics Dr.Christopher Belle 06/23/2025.    Pt is physically active with chair yoga and walking weekly.    DM2: Their last HbA1c displayed elevation of 7.0 on 12/30/2024.     HTN: Patient is compliant in taking Olmesartan-HCTZ 20-12.5mg daily. Their BP today was 158/75 and 118/80 on recheck.    HLD: Pt is compliant in taking Tricor 48mg daily. Their last lipid panel displayed elevation of Cholesterol 241 and LDL Cholesterol 151.6. on 12/30/2024.     At this time, she is otherwise doing well and has brought no other complaints to my attention today.  For a list of the medical issues addressed today, see the assessment and plan below.    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    Tx INH    PUD (peptic ulcer disease)      PSH:  has a past surgical history that includes Hysterectomy, total abdominal (1994); Colonoscopy (N/A, 1/17/2023); Cataract removal (Bilateral); Breast biopsy (Left, 1988); repair rotator cuff,chronic (02/2004); Carpal tunnel release (12/19/2017); Carpal tunnel release (Right, 12/2019); mammo diag bilat (04/21/2008); Colonoscopy (10/02/2000); orthopedic surgery (01/2004); Colonoscopy (N/A, 08/23/2016); colorectal scrn; hi risk ind (08/23/2016); Total abdominal hysterectomy w/ bilateral salpingoophorectomy; Shoulder arthroscopy; Knee arthroscopy (Right, 1999); and lipoma resection.    All: is allergic to atorvastatin, azelastine, colesevelam, ezetimibe, pravastatin, simvastatin, and ampicillin.   MEDS:   Current Outpatient Medications   Medication Sig    blood glucose test strips (ASCENSIA AUTODISC

## 2025-06-26 LAB
ALBUMIN SERPL-MCNC: 3.9 G/DL (ref 3.5–5)
ALBUMIN/GLOB SERPL: 1.1 (ref 1.1–2.2)
ALP SERPL-CCNC: 102 U/L (ref 45–117)
ALT SERPL-CCNC: 28 U/L (ref 12–78)
ANION GAP SERPL CALC-SCNC: 5 MMOL/L (ref 2–12)
AST SERPL-CCNC: 11 U/L (ref 15–37)
BILIRUB SERPL-MCNC: 0.7 MG/DL (ref 0.2–1)
BUN SERPL-MCNC: 15 MG/DL (ref 6–20)
BUN/CREAT SERPL: 17 (ref 12–20)
CALCIUM SERPL-MCNC: 9.4 MG/DL (ref 8.5–10.1)
CHLORIDE SERPL-SCNC: 105 MMOL/L (ref 97–108)
CO2 SERPL-SCNC: 28 MMOL/L (ref 21–32)
CREAT SERPL-MCNC: 0.87 MG/DL (ref 0.55–1.02)
EST. AVERAGE GLUCOSE BLD GHB EST-MCNC: 151 MG/DL
GLOBULIN SER CALC-MCNC: 3.4 G/DL (ref 2–4)
GLUCOSE SERPL-MCNC: 143 MG/DL (ref 65–100)
HBA1C MFR BLD: 6.9 % (ref 4–5.6)
POTASSIUM SERPL-SCNC: 4.1 MMOL/L (ref 3.5–5.1)
PROT SERPL-MCNC: 7.3 G/DL (ref 6.4–8.2)
SODIUM SERPL-SCNC: 138 MMOL/L (ref 136–145)

## 2025-06-28 ENCOUNTER — RESULTS FOLLOW-UP (OUTPATIENT)
Age: 75
End: 2025-06-28

## 2025-07-01 ENCOUNTER — TRANSCRIBE ORDERS (OUTPATIENT)
Facility: HOSPITAL | Age: 75
End: 2025-07-01

## 2025-07-01 DIAGNOSIS — Z12.31 VISIT FOR SCREENING MAMMOGRAM: Primary | ICD-10-CM

## 2025-08-27 ENCOUNTER — HOSPITAL ENCOUNTER (OUTPATIENT)
Facility: HOSPITAL | Age: 75
Discharge: HOME | End: 2025-08-30
Attending: FAMILY MEDICINE
Payer: MEDICARE

## 2025-08-27 DIAGNOSIS — Z13.820 OSTEOPOROSIS SCREENING: ICD-10-CM

## 2025-08-27 DIAGNOSIS — Z78.0 POST-MENOPAUSAL: ICD-10-CM

## 2025-08-27 DIAGNOSIS — Z12.31 VISIT FOR SCREENING MAMMOGRAM: ICD-10-CM

## 2025-08-27 PROCEDURE — 77080 DXA BONE DENSITY AXIAL: CPT

## 2025-08-27 PROCEDURE — 77067 SCR MAMMO BI INCL CAD: CPT

## 2025-09-01 ENCOUNTER — RESULTS FOLLOW-UP (OUTPATIENT)
Age: 75
End: 2025-09-01

## (undated) DEVICE — 1200 GUARD II KIT W/5MM TUBE W/O VAC TUBE: Brand: GUARDIAN

## (undated) DEVICE — SYRINGE MED 10ML LUERLOCK TIP W/O SFTY DISP

## (undated) DEVICE — BASIN EMSIS 16OZ GRAPHITE PLAS KID SHP MOLD GRAD FOR ORAL

## (undated) DEVICE — ELECTRODE,RADIOTRANSLUCENT,FOAM,5PK: Brand: MEDLINE

## (undated) DEVICE — NEONATAL-ADULT SPO2 SENSOR: Brand: NELLCOR

## (undated) DEVICE — Device

## (undated) DEVICE — SYRINGE MED 3ML CLR PLAS STD N CTRL LUERLOCK TIP DISP

## (undated) DEVICE — TOWEL 4 PLY TISS 19X30 SUE WHT

## (undated) DEVICE — Z DISCONTINUED PER MEDLINE LINE GAS SAMPLING O2/CO2 LNG AD 13 FT NSL W/ TBNG FILTERLINE

## (undated) DEVICE — SET ADMIN 16ML TBNG L100IN 2 Y INJ SITE IV PIGGY BK DISP (ORDER IN MULIPLES OF 48)

## (undated) DEVICE — CATH IV AUTOGRD BC PNK 20GA 25 -- INSYTE

## (undated) DEVICE — SOLIDIFIER FLD 2OZ 1500CC N DISINF IN BTL DISP SAFESORB

## (undated) DEVICE — HYPODERMIC SAFETY NEEDLE: Brand: MAGELLAN